# Patient Record
Sex: MALE | Race: OTHER | Employment: OTHER | URBAN - METROPOLITAN AREA
[De-identification: names, ages, dates, MRNs, and addresses within clinical notes are randomized per-mention and may not be internally consistent; named-entity substitution may affect disease eponyms.]

---

## 2017-03-17 ENCOUNTER — OFFICE VISIT (OUTPATIENT)
Dept: PRIMARY CARE CLINIC | Age: 57
End: 2017-03-17

## 2017-03-17 VITALS
HEART RATE: 78 BPM | BODY MASS INDEX: 27.47 KG/M2 | SYSTOLIC BLOOD PRESSURE: 136 MMHG | WEIGHT: 185.5 LBS | DIASTOLIC BLOOD PRESSURE: 82 MMHG | RESPIRATION RATE: 18 BRPM | HEIGHT: 69 IN | TEMPERATURE: 97 F

## 2017-03-17 DIAGNOSIS — R53.81 OTHER MALAISE AND FATIGUE: ICD-10-CM

## 2017-03-17 DIAGNOSIS — R53.83 OTHER MALAISE AND FATIGUE: ICD-10-CM

## 2017-03-17 DIAGNOSIS — Z12.5 SCREENING FOR PROSTATE CANCER: ICD-10-CM

## 2017-03-17 DIAGNOSIS — E78.5 DM TYPE 2 WITH DIABETIC DYSLIPIDEMIA (HCC): Primary | ICD-10-CM

## 2017-03-17 DIAGNOSIS — E11.69 DM TYPE 2 WITH DIABETIC DYSLIPIDEMIA (HCC): Primary | ICD-10-CM

## 2017-03-17 DIAGNOSIS — E78.5 HYPERLIPIDEMIA, UNSPECIFIED HYPERLIPIDEMIA TYPE: ICD-10-CM

## 2017-03-17 PROCEDURE — 99203 OFFICE O/P NEW LOW 30 MIN: CPT | Performed by: FAMILY MEDICINE

## 2017-03-17 RX ORDER — GLYBURIDE 2.5 MG/1
2.5 TABLET ORAL
COMMUNITY
End: 2017-03-21 | Stop reason: SDUPTHER

## 2017-03-17 RX ORDER — LOSARTAN POTASSIUM 25 MG/1
25 TABLET ORAL DAILY
COMMUNITY
End: 2017-03-21 | Stop reason: SDUPTHER

## 2017-03-17 RX ORDER — SIMVASTATIN 5 MG
TABLET ORAL
COMMUNITY
Start: 2017-01-09 | End: 2017-03-21 | Stop reason: SDUPTHER

## 2017-03-17 ASSESSMENT — PATIENT HEALTH QUESTIONNAIRE - PHQ9
2. FEELING DOWN, DEPRESSED OR HOPELESS: 0
SUM OF ALL RESPONSES TO PHQ QUESTIONS 1-9: 0
SUM OF ALL RESPONSES TO PHQ9 QUESTIONS 1 & 2: 0
1. LITTLE INTEREST OR PLEASURE IN DOING THINGS: 0

## 2017-03-18 DIAGNOSIS — E78.5 DM TYPE 2 WITH DIABETIC DYSLIPIDEMIA (HCC): ICD-10-CM

## 2017-03-18 DIAGNOSIS — E78.5 HYPERLIPIDEMIA, UNSPECIFIED HYPERLIPIDEMIA TYPE: ICD-10-CM

## 2017-03-18 DIAGNOSIS — Z12.5 SCREENING FOR PROSTATE CANCER: ICD-10-CM

## 2017-03-18 DIAGNOSIS — E11.69 DM TYPE 2 WITH DIABETIC DYSLIPIDEMIA (HCC): ICD-10-CM

## 2017-03-18 DIAGNOSIS — R53.83 OTHER MALAISE AND FATIGUE: ICD-10-CM

## 2017-03-18 DIAGNOSIS — R53.81 OTHER MALAISE AND FATIGUE: ICD-10-CM

## 2017-03-18 LAB
ALBUMIN SERPL-MCNC: 4.8 G/DL (ref 3.9–4.9)
ALP BLD-CCNC: 102 U/L (ref 35–104)
ALT SERPL-CCNC: 70 U/L (ref 0–41)
ANION GAP SERPL CALCULATED.3IONS-SCNC: 15 MEQ/L (ref 7–13)
AST SERPL-CCNC: 44 U/L (ref 0–40)
BASOPHILS ABSOLUTE: 0.1 K/UL (ref 0–0.2)
BASOPHILS RELATIVE PERCENT: 0.7 %
BILIRUB SERPL-MCNC: 0.4 MG/DL (ref 0–1.2)
BUN BLDV-MCNC: 11 MG/DL (ref 6–20)
CALCIUM SERPL-MCNC: 9.7 MG/DL (ref 8.6–10.2)
CHLORIDE BLD-SCNC: 97 MEQ/L (ref 98–107)
CHOLESTEROL, TOTAL: 166 MG/DL (ref 0–199)
CO2: 24 MEQ/L (ref 22–29)
CREAT SERPL-MCNC: 0.62 MG/DL (ref 0.7–1.2)
EOSINOPHILS ABSOLUTE: 0.3 K/UL (ref 0–0.7)
EOSINOPHILS RELATIVE PERCENT: 3.3 %
GFR AFRICAN AMERICAN: >60
GFR NON-AFRICAN AMERICAN: >60
GLOBULIN: 2.6 G/DL (ref 2.3–3.5)
GLUCOSE BLD-MCNC: 220 MG/DL (ref 74–109)
HBA1C MFR BLD: 8.9 % (ref 4.8–5.9)
HCT VFR BLD CALC: 44.2 % (ref 42–52)
HDLC SERPL-MCNC: 38 MG/DL (ref 40–59)
HEMOGLOBIN: 14.9 G/DL (ref 14–18)
LDL CHOLESTEROL CALCULATED: ABNORMAL MG/DL (ref 0–129)
LYMPHOCYTES ABSOLUTE: 2.7 K/UL (ref 1–4.8)
LYMPHOCYTES RELATIVE PERCENT: 32.9 %
MCH RBC QN AUTO: 30.2 PG (ref 27–31.3)
MCHC RBC AUTO-ENTMCNC: 33.7 % (ref 33–37)
MCV RBC AUTO: 89.6 FL (ref 80–100)
MONOCYTES ABSOLUTE: 0.5 K/UL (ref 0.2–0.8)
MONOCYTES RELATIVE PERCENT: 5.5 %
NEUTROPHILS ABSOLUTE: 4.7 K/UL (ref 1.4–6.5)
NEUTROPHILS RELATIVE PERCENT: 57.6 %
PDW BLD-RTO: 13 % (ref 11.5–14.5)
PLATELET # BLD: 280 K/UL (ref 130–400)
POTASSIUM SERPL-SCNC: 4.5 MEQ/L (ref 3.5–5.1)
PROSTATE SPECIFIC ANTIGEN: 2.27 NG/ML (ref 0–3.89)
RBC # BLD: 4.93 M/UL (ref 4.7–6.1)
SLIDE REVIEW: NORMAL
SODIUM BLD-SCNC: 136 MEQ/L (ref 132–144)
TOTAL PROTEIN: 7.4 G/DL (ref 6.4–8.1)
TRIGL SERPL-MCNC: 552 MG/DL (ref 0–200)
WBC # BLD: 8.2 K/UL (ref 4.8–10.8)

## 2017-03-21 ENCOUNTER — OFFICE VISIT (OUTPATIENT)
Dept: PRIMARY CARE CLINIC | Age: 57
End: 2017-03-21

## 2017-03-21 VITALS
RESPIRATION RATE: 18 BRPM | HEART RATE: 88 BPM | SYSTOLIC BLOOD PRESSURE: 136 MMHG | DIASTOLIC BLOOD PRESSURE: 88 MMHG | HEIGHT: 69 IN | TEMPERATURE: 97 F | WEIGHT: 183.8 LBS | BODY MASS INDEX: 27.22 KG/M2

## 2017-03-21 DIAGNOSIS — E78.5 DM TYPE 2 WITH DIABETIC DYSLIPIDEMIA (HCC): Primary | ICD-10-CM

## 2017-03-21 DIAGNOSIS — E78.5 HYPERLIPIDEMIA, UNSPECIFIED HYPERLIPIDEMIA TYPE: ICD-10-CM

## 2017-03-21 DIAGNOSIS — R53.83 OTHER MALAISE AND FATIGUE: ICD-10-CM

## 2017-03-21 DIAGNOSIS — R53.81 OTHER MALAISE AND FATIGUE: ICD-10-CM

## 2017-03-21 DIAGNOSIS — N52.8 OTHER MALE ERECTILE DYSFUNCTION: ICD-10-CM

## 2017-03-21 DIAGNOSIS — E11.69 DM TYPE 2 WITH DIABETIC DYSLIPIDEMIA (HCC): Primary | ICD-10-CM

## 2017-03-21 PROBLEM — N52.9 ERECTILE DYSFUNCTION: Status: ACTIVE | Noted: 2017-03-21

## 2017-03-21 PROCEDURE — 99213 OFFICE O/P EST LOW 20 MIN: CPT | Performed by: FAMILY MEDICINE

## 2017-03-21 RX ORDER — GLYBURIDE 2.5 MG/1
2.5 TABLET ORAL
Qty: 90 TABLET | Refills: 1 | Status: SHIPPED | OUTPATIENT
Start: 2017-03-21 | End: 2017-09-20 | Stop reason: SDUPTHER

## 2017-03-21 RX ORDER — LOSARTAN POTASSIUM 25 MG/1
25 TABLET ORAL DAILY
Qty: 90 TABLET | Refills: 1 | Status: SHIPPED | OUTPATIENT
Start: 2017-03-21 | End: 2017-09-20 | Stop reason: SDUPTHER

## 2017-03-21 RX ORDER — SIMVASTATIN 20 MG
TABLET ORAL
Qty: 90 TABLET | Refills: 1 | Status: SHIPPED | OUTPATIENT
Start: 2017-03-21 | End: 2017-09-20 | Stop reason: SDUPTHER

## 2017-03-29 ASSESSMENT — ENCOUNTER SYMPTOMS: BLURRED VISION: 0

## 2017-04-03 ENCOUNTER — TELEPHONE (OUTPATIENT)
Dept: PRIMARY CARE CLINIC | Age: 57
End: 2017-04-03

## 2017-04-19 ENCOUNTER — OFFICE VISIT (OUTPATIENT)
Dept: PRIMARY CARE CLINIC | Age: 57
End: 2017-04-19

## 2017-04-19 VITALS
BODY MASS INDEX: 27.4 KG/M2 | RESPIRATION RATE: 16 BRPM | SYSTOLIC BLOOD PRESSURE: 120 MMHG | HEIGHT: 69 IN | WEIGHT: 185 LBS | TEMPERATURE: 97.6 F | DIASTOLIC BLOOD PRESSURE: 86 MMHG | HEART RATE: 67 BPM | OXYGEN SATURATION: 90 %

## 2017-04-19 DIAGNOSIS — N52.8 OTHER MALE ERECTILE DYSFUNCTION: ICD-10-CM

## 2017-04-19 DIAGNOSIS — E11.69 DM TYPE 2 WITH DIABETIC DYSLIPIDEMIA (HCC): Primary | ICD-10-CM

## 2017-04-19 DIAGNOSIS — E78.5 DM TYPE 2 WITH DIABETIC DYSLIPIDEMIA (HCC): Primary | ICD-10-CM

## 2017-04-19 DIAGNOSIS — E78.5 HYPERLIPIDEMIA, UNSPECIFIED HYPERLIPIDEMIA TYPE: ICD-10-CM

## 2017-04-19 PROCEDURE — 99214 OFFICE O/P EST MOD 30 MIN: CPT | Performed by: FAMILY MEDICINE

## 2017-04-19 ASSESSMENT — ENCOUNTER SYMPTOMS
CHEST TIGHTNESS: 0
SHORTNESS OF BREATH: 0

## 2017-04-30 ASSESSMENT — ENCOUNTER SYMPTOMS
APNEA: 0
EYE DISCHARGE: 0
ABDOMINAL DISTENTION: 0

## 2017-06-06 ENCOUNTER — OFFICE VISIT (OUTPATIENT)
Dept: PRIMARY CARE CLINIC | Age: 57
End: 2017-06-06

## 2017-06-06 VITALS
OXYGEN SATURATION: 97 % | RESPIRATION RATE: 16 BRPM | DIASTOLIC BLOOD PRESSURE: 80 MMHG | HEART RATE: 82 BPM | BODY MASS INDEX: 27.7 KG/M2 | TEMPERATURE: 96.2 F | WEIGHT: 187 LBS | SYSTOLIC BLOOD PRESSURE: 132 MMHG | HEIGHT: 69 IN

## 2017-06-06 DIAGNOSIS — R53.83 FATIGUE, UNSPECIFIED TYPE: ICD-10-CM

## 2017-06-06 DIAGNOSIS — E78.5 DM TYPE 2 WITH DIABETIC DYSLIPIDEMIA (HCC): Primary | ICD-10-CM

## 2017-06-06 DIAGNOSIS — E78.5 HYPERLIPIDEMIA, UNSPECIFIED HYPERLIPIDEMIA TYPE: ICD-10-CM

## 2017-06-06 DIAGNOSIS — E11.69 DM TYPE 2 WITH DIABETIC DYSLIPIDEMIA (HCC): Primary | ICD-10-CM

## 2017-06-06 PROCEDURE — 99214 OFFICE O/P EST MOD 30 MIN: CPT | Performed by: FAMILY MEDICINE

## 2017-06-06 ASSESSMENT — ENCOUNTER SYMPTOMS
SHORTNESS OF BREATH: 0
CHEST TIGHTNESS: 0

## 2017-06-07 DIAGNOSIS — R53.83 FATIGUE, UNSPECIFIED TYPE: ICD-10-CM

## 2017-06-07 DIAGNOSIS — E78.5 HYPERLIPIDEMIA, UNSPECIFIED HYPERLIPIDEMIA TYPE: ICD-10-CM

## 2017-06-07 DIAGNOSIS — E78.5 DM TYPE 2 WITH DIABETIC DYSLIPIDEMIA (HCC): ICD-10-CM

## 2017-06-07 DIAGNOSIS — E11.69 DM TYPE 2 WITH DIABETIC DYSLIPIDEMIA (HCC): ICD-10-CM

## 2017-06-07 LAB
ALBUMIN SERPL-MCNC: 4.6 G/DL (ref 3.9–4.9)
ALP BLD-CCNC: 73 U/L (ref 35–104)
ALT SERPL-CCNC: 40 U/L (ref 0–41)
ANION GAP SERPL CALCULATED.3IONS-SCNC: 18 MEQ/L (ref 7–13)
AST SERPL-CCNC: 42 U/L (ref 0–40)
BASOPHILS ABSOLUTE: 0.1 K/UL (ref 0–0.2)
BASOPHILS RELATIVE PERCENT: 0.9 %
BILIRUB SERPL-MCNC: 1.6 MG/DL (ref 0–1.2)
BUN BLDV-MCNC: 8 MG/DL (ref 6–20)
CALCIUM SERPL-MCNC: 9.2 MG/DL (ref 8.6–10.2)
CHLORIDE BLD-SCNC: 97 MEQ/L (ref 98–107)
CHOLESTEROL, TOTAL: 123 MG/DL (ref 0–199)
CO2: 22 MEQ/L (ref 22–29)
CREAT SERPL-MCNC: 0.59 MG/DL (ref 0.7–1.2)
EOSINOPHILS ABSOLUTE: 0.2 K/UL (ref 0–0.7)
EOSINOPHILS RELATIVE PERCENT: 3.4 %
GFR AFRICAN AMERICAN: >60
GFR NON-AFRICAN AMERICAN: >60
GLOBULIN: 2.5 G/DL (ref 2.3–3.5)
GLUCOSE BLD-MCNC: 128 MG/DL (ref 74–109)
HBA1C MFR BLD: 7.6 % (ref 4.8–5.9)
HCT VFR BLD CALC: 42.2 % (ref 42–52)
HDLC SERPL-MCNC: 52 MG/DL (ref 40–59)
HEMOGLOBIN: 14.3 G/DL (ref 14–18)
LDL CHOLESTEROL CALCULATED: 37 MG/DL (ref 0–129)
LYMPHOCYTES ABSOLUTE: 3.1 K/UL (ref 1–4.8)
LYMPHOCYTES RELATIVE PERCENT: 44.6 %
MCH RBC QN AUTO: 30.4 PG (ref 27–31.3)
MCHC RBC AUTO-ENTMCNC: 33.9 % (ref 33–37)
MCV RBC AUTO: 89.7 FL (ref 80–100)
MONOCYTES ABSOLUTE: 0.6 K/UL (ref 0.2–0.8)
MONOCYTES RELATIVE PERCENT: 8.1 %
NEUTROPHILS ABSOLUTE: 3 K/UL (ref 1.4–6.5)
NEUTROPHILS RELATIVE PERCENT: 43 %
PDW BLD-RTO: 13 % (ref 11.5–14.5)
PLATELET # BLD: 241 K/UL (ref 130–400)
POTASSIUM SERPL-SCNC: 4.4 MEQ/L (ref 3.5–5.1)
RBC # BLD: 4.71 M/UL (ref 4.7–6.1)
SLIDE REVIEW: NORMAL
SODIUM BLD-SCNC: 137 MEQ/L (ref 132–144)
TOTAL PROTEIN: 7.1 G/DL (ref 6.4–8.1)
TRIGL SERPL-MCNC: 170 MG/DL (ref 0–200)
WBC # BLD: 6.9 K/UL (ref 4.8–10.8)

## 2017-06-12 ENCOUNTER — TELEPHONE (OUTPATIENT)
Dept: PRIMARY CARE CLINIC | Age: 57
End: 2017-06-12

## 2017-07-19 ENCOUNTER — OFFICE VISIT (OUTPATIENT)
Dept: PRIMARY CARE CLINIC | Age: 57
End: 2017-07-19

## 2017-07-19 VITALS
HEIGHT: 69 IN | WEIGHT: 186 LBS | TEMPERATURE: 97.4 F | OXYGEN SATURATION: 96 % | RESPIRATION RATE: 16 BRPM | HEART RATE: 92 BPM | SYSTOLIC BLOOD PRESSURE: 124 MMHG | DIASTOLIC BLOOD PRESSURE: 84 MMHG | BODY MASS INDEX: 27.55 KG/M2

## 2017-07-19 DIAGNOSIS — E78.5 HYPERLIPIDEMIA, UNSPECIFIED HYPERLIPIDEMIA TYPE: ICD-10-CM

## 2017-07-19 DIAGNOSIS — E11.69 DM TYPE 2 WITH DIABETIC DYSLIPIDEMIA (HCC): Primary | ICD-10-CM

## 2017-07-19 DIAGNOSIS — N52.8 OTHER MALE ERECTILE DYSFUNCTION: ICD-10-CM

## 2017-07-19 DIAGNOSIS — E78.5 DM TYPE 2 WITH DIABETIC DYSLIPIDEMIA (HCC): Primary | ICD-10-CM

## 2017-07-19 PROCEDURE — 99214 OFFICE O/P EST MOD 30 MIN: CPT | Performed by: FAMILY MEDICINE

## 2017-07-19 RX ORDER — LOSARTAN POTASSIUM 25 MG/1
25 TABLET ORAL DAILY
Qty: 90 TABLET | Refills: 1 | Status: CANCELLED | OUTPATIENT
Start: 2017-07-19

## 2017-07-19 RX ORDER — GLYBURIDE 2.5 MG/1
2.5 TABLET ORAL
Qty: 90 TABLET | Refills: 1 | Status: CANCELLED | OUTPATIENT
Start: 2017-07-19

## 2017-07-19 RX ORDER — SIMVASTATIN 20 MG
TABLET ORAL
Qty: 90 TABLET | Refills: 1 | Status: CANCELLED | OUTPATIENT
Start: 2017-07-19

## 2017-07-19 ASSESSMENT — ENCOUNTER SYMPTOMS
BLURRED VISION: 0
SHORTNESS OF BREATH: 0

## 2017-09-20 ENCOUNTER — OFFICE VISIT (OUTPATIENT)
Dept: PRIMARY CARE CLINIC | Age: 57
End: 2017-09-20

## 2017-09-20 VITALS
RESPIRATION RATE: 16 BRPM | WEIGHT: 183 LBS | DIASTOLIC BLOOD PRESSURE: 78 MMHG | HEIGHT: 69 IN | BODY MASS INDEX: 27.11 KG/M2 | SYSTOLIC BLOOD PRESSURE: 96 MMHG | TEMPERATURE: 98 F | HEART RATE: 76 BPM

## 2017-09-20 DIAGNOSIS — E11.69 DM TYPE 2 WITH DIABETIC DYSLIPIDEMIA (HCC): Primary | ICD-10-CM

## 2017-09-20 DIAGNOSIS — E78.5 HYPERLIPIDEMIA, UNSPECIFIED HYPERLIPIDEMIA TYPE: ICD-10-CM

## 2017-09-20 DIAGNOSIS — E78.5 DM TYPE 2 WITH DIABETIC DYSLIPIDEMIA (HCC): Primary | ICD-10-CM

## 2017-09-20 DIAGNOSIS — R53.83 FATIGUE, UNSPECIFIED TYPE: ICD-10-CM

## 2017-09-20 PROCEDURE — 99214 OFFICE O/P EST MOD 30 MIN: CPT | Performed by: FAMILY MEDICINE

## 2017-09-20 RX ORDER — GLYBURIDE 2.5 MG/1
2.5 TABLET ORAL
Qty: 90 TABLET | Refills: 1 | Status: SHIPPED | OUTPATIENT
Start: 2017-09-20 | End: 2018-01-30 | Stop reason: SDUPTHER

## 2017-09-20 RX ORDER — SIMVASTATIN 20 MG
TABLET ORAL
Qty: 90 TABLET | Refills: 1 | Status: SHIPPED | OUTPATIENT
Start: 2017-09-20 | End: 2017-11-14 | Stop reason: SINTOL

## 2017-09-20 RX ORDER — LOSARTAN POTASSIUM 25 MG/1
25 TABLET ORAL DAILY
Qty: 90 TABLET | Refills: 1 | Status: SHIPPED | OUTPATIENT
Start: 2017-09-20 | End: 2018-01-30 | Stop reason: SDUPTHER

## 2017-09-20 ASSESSMENT — ENCOUNTER SYMPTOMS
BLURRED VISION: 0
SHORTNESS OF BREATH: 0

## 2017-11-08 DIAGNOSIS — E11.69 DM TYPE 2 WITH DIABETIC DYSLIPIDEMIA (HCC): ICD-10-CM

## 2017-11-08 DIAGNOSIS — E78.5 HYPERLIPIDEMIA, UNSPECIFIED HYPERLIPIDEMIA TYPE: ICD-10-CM

## 2017-11-08 DIAGNOSIS — E78.5 DM TYPE 2 WITH DIABETIC DYSLIPIDEMIA (HCC): ICD-10-CM

## 2017-11-08 DIAGNOSIS — R53.83 FATIGUE, UNSPECIFIED TYPE: ICD-10-CM

## 2017-11-08 LAB
ALBUMIN SERPL-MCNC: 4.7 G/DL (ref 3.9–4.9)
ALP BLD-CCNC: 71 U/L (ref 35–104)
ALT SERPL-CCNC: 24 U/L (ref 0–41)
ANION GAP SERPL CALCULATED.3IONS-SCNC: 14 MEQ/L (ref 7–13)
AST SERPL-CCNC: 21 U/L (ref 0–40)
BASOPHILS ABSOLUTE: 0 K/UL (ref 0–0.2)
BASOPHILS RELATIVE PERCENT: 0.5 %
BILIRUB SERPL-MCNC: 0.8 MG/DL (ref 0–1.2)
BUN BLDV-MCNC: 9 MG/DL (ref 6–20)
CALCIUM SERPL-MCNC: 9.4 MG/DL (ref 8.6–10.2)
CHLORIDE BLD-SCNC: 99 MEQ/L (ref 98–107)
CHOLESTEROL, TOTAL: 124 MG/DL (ref 0–199)
CO2: 25 MEQ/L (ref 22–29)
CREAT SERPL-MCNC: 0.55 MG/DL (ref 0.7–1.2)
CREATININE URINE: 152.9 MG/DL
EOSINOPHILS ABSOLUTE: 0.4 K/UL (ref 0–0.7)
EOSINOPHILS RELATIVE PERCENT: 4.5 %
GFR AFRICAN AMERICAN: >60
GFR NON-AFRICAN AMERICAN: >60
GLOBULIN: 2.6 G/DL (ref 2.3–3.5)
GLUCOSE BLD-MCNC: 140 MG/DL (ref 74–109)
HBA1C MFR BLD: 7.4 % (ref 4.8–5.9)
HCT VFR BLD CALC: 42.5 % (ref 42–52)
HDLC SERPL-MCNC: 53 MG/DL (ref 40–59)
HEMOGLOBIN: 14.1 G/DL (ref 14–18)
LDL CHOLESTEROL CALCULATED: 49 MG/DL (ref 0–129)
LYMPHOCYTES ABSOLUTE: 3 K/UL (ref 1–4.8)
LYMPHOCYTES RELATIVE PERCENT: 34.6 %
MCH RBC QN AUTO: 29.7 PG (ref 27–31.3)
MCHC RBC AUTO-ENTMCNC: 33.1 % (ref 33–37)
MCV RBC AUTO: 89.7 FL (ref 80–100)
MICROALBUMIN UR-MCNC: 2.8 MG/DL
MICROALBUMIN/CREAT UR-RTO: 18.3 MG/G (ref 0–30)
MONOCYTES ABSOLUTE: 0.7 K/UL (ref 0.2–0.8)
MONOCYTES RELATIVE PERCENT: 7.7 %
NEUTROPHILS ABSOLUTE: 4.6 K/UL (ref 1.4–6.5)
NEUTROPHILS RELATIVE PERCENT: 52.7 %
PDW BLD-RTO: 13.1 % (ref 11.5–14.5)
PLATELET # BLD: 254 K/UL (ref 130–400)
POTASSIUM SERPL-SCNC: 4.6 MEQ/L (ref 3.5–5.1)
RBC # BLD: 4.74 M/UL (ref 4.7–6.1)
SODIUM BLD-SCNC: 138 MEQ/L (ref 132–144)
TOTAL PROTEIN: 7.3 G/DL (ref 6.4–8.1)
TRIGL SERPL-MCNC: 112 MG/DL (ref 0–200)
WBC # BLD: 8.8 K/UL (ref 4.8–10.8)

## 2017-11-14 ENCOUNTER — OFFICE VISIT (OUTPATIENT)
Dept: PRIMARY CARE CLINIC | Age: 57
End: 2017-11-14

## 2017-11-14 VITALS
RESPIRATION RATE: 16 BRPM | HEART RATE: 76 BPM | BODY MASS INDEX: 27.25 KG/M2 | SYSTOLIC BLOOD PRESSURE: 128 MMHG | HEIGHT: 69 IN | WEIGHT: 184 LBS | DIASTOLIC BLOOD PRESSURE: 84 MMHG | OXYGEN SATURATION: 97 % | TEMPERATURE: 98.4 F

## 2017-11-14 DIAGNOSIS — M19.90 OSTEOARTHRITIS, UNSPECIFIED OSTEOARTHRITIS TYPE, UNSPECIFIED SITE: ICD-10-CM

## 2017-11-14 DIAGNOSIS — Z12.11 SCREENING FOR COLON CANCER: ICD-10-CM

## 2017-11-14 DIAGNOSIS — N52.8 OTHER MALE ERECTILE DYSFUNCTION: ICD-10-CM

## 2017-11-14 DIAGNOSIS — E11.69 DM TYPE 2 WITH DIABETIC DYSLIPIDEMIA (HCC): Primary | ICD-10-CM

## 2017-11-14 DIAGNOSIS — E78.5 DM TYPE 2 WITH DIABETIC DYSLIPIDEMIA (HCC): Primary | ICD-10-CM

## 2017-11-14 DIAGNOSIS — E78.5 HYPERLIPIDEMIA, UNSPECIFIED HYPERLIPIDEMIA TYPE: ICD-10-CM

## 2017-11-14 PROCEDURE — G8427 DOCREV CUR MEDS BY ELIG CLIN: HCPCS | Performed by: FAMILY MEDICINE

## 2017-11-14 PROCEDURE — 1036F TOBACCO NON-USER: CPT | Performed by: FAMILY MEDICINE

## 2017-11-14 PROCEDURE — G8417 CALC BMI ABV UP PARAM F/U: HCPCS | Performed by: FAMILY MEDICINE

## 2017-11-14 PROCEDURE — 3045F PR MOST RECENT HEMOGLOBIN A1C LEVEL 7.0-9.0%: CPT | Performed by: FAMILY MEDICINE

## 2017-11-14 PROCEDURE — 99214 OFFICE O/P EST MOD 30 MIN: CPT | Performed by: FAMILY MEDICINE

## 2017-11-14 PROCEDURE — G8484 FLU IMMUNIZE NO ADMIN: HCPCS | Performed by: FAMILY MEDICINE

## 2017-11-14 PROCEDURE — 3017F COLORECTAL CA SCREEN DOC REV: CPT | Performed by: FAMILY MEDICINE

## 2017-11-14 RX ORDER — DICLOFENAC SODIUM 75 MG/1
75 TABLET, DELAYED RELEASE ORAL 2 TIMES DAILY
Qty: 60 TABLET | Refills: 1 | Status: SHIPPED | OUTPATIENT
Start: 2017-11-14 | End: 2018-04-24

## 2017-11-14 RX ORDER — ATORVASTATIN CALCIUM 10 MG/1
10 TABLET, FILM COATED ORAL DAILY
Qty: 30 TABLET | Refills: 5 | Status: SHIPPED | OUTPATIENT
Start: 2017-11-14 | End: 2018-01-30 | Stop reason: SDUPTHER

## 2017-11-14 ASSESSMENT — ENCOUNTER SYMPTOMS
ABDOMINAL PAIN: 0
VISUAL CHANGE: 0
SHORTNESS OF BREATH: 0
WHEEZING: 0
BLURRED VISION: 0

## 2017-11-14 NOTE — PROGRESS NOTES
Subjective:      Patient ID: Kathy Gomez is a 65 y.o. male who presents today for:  Chief Complaint   Patient presents with    Diabetes     patient following up for chronic DM. patient states his glucose was 160 this morning. patient reports NO chest pain, sob, dizziness, lightheadedness, sweats, or tremors. patient would like to discuss lab results.  Hyperlipidemia     patient would like to discuss changing medications. patient is currently taking simvastatin and states he has had alot of joint pain since starting this medication. patient states his alot of his family members has had similar issues with this. Diabetes   He presents for his follow-up diabetic visit. He has type 2 diabetes mellitus. His disease course has been stable. There are no hypoglycemic associated symptoms. Pertinent negatives for hypoglycemia include no dizziness or headaches. Pertinent negatives for diabetes include no blurred vision, no chest pain, no fatigue, no foot ulcerations, no visual change, no weakness and no weight loss. There are no hypoglycemic complications. Symptoms are stable. Risk factors for coronary artery disease include dyslipidemia, diabetes mellitus, hypertension and male sex. Current diabetic treatment includes oral agent (triple therapy). He is compliant with treatment all of the time. His weight is stable. He is following a diabetic diet. When asked about meal planning, he reported none. He has not had a previous visit with a dietitian. He participates in exercise intermittently. His overall blood glucose range is 140-180 mg/dl. He does not see a podiatrist.Eye exam is current. Hyperlipidemia   This is a chronic problem. The current episode started more than 1 year ago. The problem is controlled. There are no known factors aggravating his hyperlipidemia. Associated symptoms include myalgias. Pertinent negatives include no chest pain, leg pain or shortness of breath.  Current antihyperlipidemic treatment includes statins. The current treatment provides significant improvement of lipids. Risk factors for coronary artery disease include hypertension, dyslipidemia, diabetes mellitus and male sex. Past Medical History:   Diagnosis Date    DM (diabetes mellitus screen)      Past Surgical History:   Procedure Laterality Date    APPENDECTOMY       Family History   Problem Relation Age of Onset    Stroke Father     Arthritis Sister     Diabetes Sister     Diabetes Brother      Social History     Social History    Marital status:      Spouse name: N/A    Number of children: N/A    Years of education: N/A     Occupational History    Not on file. Social History Main Topics    Smoking status: Never Smoker    Smokeless tobacco: Never Used    Alcohol use 3.6 oz/week     6 Cans of beer per week    Drug use: No    Sexual activity: Not on file     Other Topics Concern    Not on file     Social History Narrative    No narrative on file     Allergies:  Review of patient's allergies indicates no known allergies. Review of Systems   Constitutional: Negative for activity change, appetite change, chills, diaphoresis, fatigue, fever, unexpected weight change and weight loss. Eyes: Negative for blurred vision. Respiratory: Negative for shortness of breath and wheezing. Cardiovascular: Negative for chest pain, palpitations and leg swelling. Gastrointestinal: Negative for abdominal pain. Musculoskeletal: Positive for myalgias. Neurological: Negative for dizziness, weakness, light-headedness and headaches. Objective:   /84   Pulse 76   Temp 98.4 °F (36.9 °C) (Tympanic)   Resp 16   Ht 5' 9\" (1.753 m)   Wt 184 lb (83.5 kg)   SpO2 97%   BMI 27.17 kg/m²     Physical Exam      PHYSICAL EXAMINATION:   VITAL SIGNS: are as recorded. GENERAL:  The patient appears well nourished and well-developed, and with normal affect. No acute respiratory distress.  Alert and oriented signed by: Valeria Beasley MD    11/14/17 2:31 PM    MD Ayo Gibbons

## 2018-01-30 ENCOUNTER — OFFICE VISIT (OUTPATIENT)
Dept: PRIMARY CARE CLINIC | Age: 58
End: 2018-01-30
Payer: COMMERCIAL

## 2018-01-30 VITALS
HEIGHT: 69 IN | BODY MASS INDEX: 27.11 KG/M2 | SYSTOLIC BLOOD PRESSURE: 102 MMHG | RESPIRATION RATE: 14 BRPM | TEMPERATURE: 97.8 F | DIASTOLIC BLOOD PRESSURE: 74 MMHG | WEIGHT: 183 LBS | HEART RATE: 92 BPM

## 2018-01-30 DIAGNOSIS — E78.5 DM TYPE 2 WITH DIABETIC DYSLIPIDEMIA (HCC): ICD-10-CM

## 2018-01-30 DIAGNOSIS — E55.9 VITAMIN D DEFICIENCY: ICD-10-CM

## 2018-01-30 DIAGNOSIS — Z12.5 PROSTATE CANCER SCREENING: ICD-10-CM

## 2018-01-30 DIAGNOSIS — E11.69 DM TYPE 2 WITH DIABETIC DYSLIPIDEMIA (HCC): ICD-10-CM

## 2018-01-30 DIAGNOSIS — R53.83 FATIGUE, UNSPECIFIED TYPE: ICD-10-CM

## 2018-01-30 DIAGNOSIS — E78.5 HYPERLIPIDEMIA, UNSPECIFIED HYPERLIPIDEMIA TYPE: Primary | ICD-10-CM

## 2018-01-30 PROCEDURE — G8427 DOCREV CUR MEDS BY ELIG CLIN: HCPCS | Performed by: FAMILY MEDICINE

## 2018-01-30 PROCEDURE — 1036F TOBACCO NON-USER: CPT | Performed by: FAMILY MEDICINE

## 2018-01-30 PROCEDURE — G8417 CALC BMI ABV UP PARAM F/U: HCPCS | Performed by: FAMILY MEDICINE

## 2018-01-30 PROCEDURE — G8484 FLU IMMUNIZE NO ADMIN: HCPCS | Performed by: FAMILY MEDICINE

## 2018-01-30 PROCEDURE — 3046F HEMOGLOBIN A1C LEVEL >9.0%: CPT | Performed by: FAMILY MEDICINE

## 2018-01-30 PROCEDURE — 99214 OFFICE O/P EST MOD 30 MIN: CPT | Performed by: FAMILY MEDICINE

## 2018-01-30 PROCEDURE — 3017F COLORECTAL CA SCREEN DOC REV: CPT | Performed by: FAMILY MEDICINE

## 2018-01-30 RX ORDER — ATORVASTATIN CALCIUM 10 MG/1
10 TABLET, FILM COATED ORAL DAILY
Qty: 90 TABLET | Refills: 1 | Status: SHIPPED | OUTPATIENT
Start: 2018-01-30 | End: 2018-07-09 | Stop reason: SDUPTHER

## 2018-01-30 RX ORDER — LOSARTAN POTASSIUM 25 MG/1
25 TABLET ORAL DAILY
Qty: 90 TABLET | Refills: 1 | Status: SHIPPED | OUTPATIENT
Start: 2018-01-30 | End: 2018-07-09 | Stop reason: SDUPTHER

## 2018-01-30 RX ORDER — GLYBURIDE 2.5 MG/1
2.5 TABLET ORAL
Qty: 90 TABLET | Refills: 1 | Status: SHIPPED | OUTPATIENT
Start: 2018-01-30 | End: 2018-04-24 | Stop reason: SDUPTHER

## 2018-01-30 ASSESSMENT — ENCOUNTER SYMPTOMS
CHEST TIGHTNESS: 0
SHORTNESS OF BREATH: 0

## 2018-04-21 DIAGNOSIS — Z12.5 PROSTATE CANCER SCREENING: ICD-10-CM

## 2018-04-21 DIAGNOSIS — E78.5 HYPERLIPIDEMIA, UNSPECIFIED HYPERLIPIDEMIA TYPE: ICD-10-CM

## 2018-04-21 DIAGNOSIS — E11.69 DM TYPE 2 WITH DIABETIC DYSLIPIDEMIA (HCC): ICD-10-CM

## 2018-04-21 DIAGNOSIS — E78.5 DM TYPE 2 WITH DIABETIC DYSLIPIDEMIA (HCC): ICD-10-CM

## 2018-04-21 DIAGNOSIS — R53.83 FATIGUE, UNSPECIFIED TYPE: ICD-10-CM

## 2018-04-21 DIAGNOSIS — E55.9 VITAMIN D DEFICIENCY: ICD-10-CM

## 2018-04-21 LAB
ALBUMIN SERPL-MCNC: 4.8 G/DL (ref 3.9–4.9)
ALP BLD-CCNC: 81 U/L (ref 35–104)
ALT SERPL-CCNC: 21 U/L (ref 0–41)
ANION GAP SERPL CALCULATED.3IONS-SCNC: 17 MEQ/L (ref 7–13)
AST SERPL-CCNC: 19 U/L (ref 0–40)
BILIRUB SERPL-MCNC: 1 MG/DL (ref 0–1.2)
BUN BLDV-MCNC: 10 MG/DL (ref 6–20)
CALCIUM SERPL-MCNC: 9 MG/DL (ref 8.6–10.2)
CHLORIDE BLD-SCNC: 100 MEQ/L (ref 98–107)
CHOLESTEROL, TOTAL: 121 MG/DL (ref 0–199)
CO2: 26 MEQ/L (ref 22–29)
CREAT SERPL-MCNC: 0.61 MG/DL (ref 0.7–1.2)
GFR AFRICAN AMERICAN: >60
GFR NON-AFRICAN AMERICAN: >60
GLOBULIN: 2.3 G/DL (ref 2.3–3.5)
GLUCOSE BLD-MCNC: 159 MG/DL (ref 74–109)
HBA1C MFR BLD: 8.4 % (ref 4.8–5.9)
HCT VFR BLD CALC: 43 % (ref 42–52)
HDLC SERPL-MCNC: 48 MG/DL (ref 40–59)
HEMOGLOBIN: 14.7 G/DL (ref 14–18)
LDL CHOLESTEROL CALCULATED: 42 MG/DL (ref 0–129)
MCH RBC QN AUTO: 30.9 PG (ref 27–31.3)
MCHC RBC AUTO-ENTMCNC: 34.2 % (ref 33–37)
MCV RBC AUTO: 90.3 FL (ref 80–100)
PDW BLD-RTO: 13 % (ref 11.5–14.5)
PLATELET # BLD: 259 K/UL (ref 130–400)
POTASSIUM SERPL-SCNC: 4.5 MEQ/L (ref 3.5–5.1)
PROSTATE SPECIFIC ANTIGEN: 2.08 NG/ML (ref 0–3.89)
RBC # BLD: 4.77 M/UL (ref 4.7–6.1)
SODIUM BLD-SCNC: 143 MEQ/L (ref 132–144)
TOTAL PROTEIN: 7.1 G/DL (ref 6.4–8.1)
TRIGL SERPL-MCNC: 157 MG/DL (ref 0–200)
VITAMIN D 25-HYDROXY: 12.7 NG/ML (ref 30–100)
WBC # BLD: 7.3 K/UL (ref 4.8–10.8)

## 2018-04-24 ENCOUNTER — OFFICE VISIT (OUTPATIENT)
Dept: PRIMARY CARE CLINIC | Age: 58
End: 2018-04-24
Payer: COMMERCIAL

## 2018-04-24 VITALS
BODY MASS INDEX: 27.4 KG/M2 | HEIGHT: 69 IN | WEIGHT: 185 LBS | RESPIRATION RATE: 16 BRPM | HEART RATE: 81 BPM | TEMPERATURE: 98.2 F | DIASTOLIC BLOOD PRESSURE: 84 MMHG | SYSTOLIC BLOOD PRESSURE: 122 MMHG | OXYGEN SATURATION: 98 %

## 2018-04-24 DIAGNOSIS — E11.69 DM TYPE 2 WITH DIABETIC DYSLIPIDEMIA (HCC): ICD-10-CM

## 2018-04-24 DIAGNOSIS — E78.5 DM TYPE 2 WITH DIABETIC DYSLIPIDEMIA (HCC): ICD-10-CM

## 2018-04-24 DIAGNOSIS — E55.9 VITAMIN D DEFICIENCY: ICD-10-CM

## 2018-04-24 DIAGNOSIS — E78.5 HYPERLIPIDEMIA, UNSPECIFIED HYPERLIPIDEMIA TYPE: Primary | ICD-10-CM

## 2018-04-24 PROCEDURE — G8427 DOCREV CUR MEDS BY ELIG CLIN: HCPCS | Performed by: FAMILY MEDICINE

## 2018-04-24 PROCEDURE — 1036F TOBACCO NON-USER: CPT | Performed by: FAMILY MEDICINE

## 2018-04-24 PROCEDURE — G8417 CALC BMI ABV UP PARAM F/U: HCPCS | Performed by: FAMILY MEDICINE

## 2018-04-24 PROCEDURE — 3045F PR MOST RECENT HEMOGLOBIN A1C LEVEL 7.0-9.0%: CPT | Performed by: FAMILY MEDICINE

## 2018-04-24 PROCEDURE — 2022F DILAT RTA XM EVC RTNOPTHY: CPT | Performed by: FAMILY MEDICINE

## 2018-04-24 PROCEDURE — 99214 OFFICE O/P EST MOD 30 MIN: CPT | Performed by: FAMILY MEDICINE

## 2018-04-24 PROCEDURE — 3017F COLORECTAL CA SCREEN DOC REV: CPT | Performed by: FAMILY MEDICINE

## 2018-04-24 RX ORDER — CHOLECALCIFEROL (VITAMIN D3) 50 MCG
2000 TABLET ORAL DAILY
Qty: 90 TABLET | Refills: 1 | Status: SHIPPED | OUTPATIENT
Start: 2018-04-24 | End: 2018-10-23 | Stop reason: SDUPTHER

## 2018-04-24 RX ORDER — PIOGLITAZONEHYDROCHLORIDE 45 MG/1
45 TABLET ORAL DAILY
Qty: 90 TABLET | Refills: 1 | Status: SHIPPED | OUTPATIENT
Start: 2018-04-24 | End: 2018-10-23 | Stop reason: SDUPTHER

## 2018-04-24 RX ORDER — GLYBURIDE 2.5 MG/1
2.5 TABLET ORAL
Qty: 90 TABLET | Refills: 1 | Status: SHIPPED | OUTPATIENT
Start: 2018-04-24 | End: 2018-10-23 | Stop reason: SDUPTHER

## 2018-04-24 RX ORDER — METFORMIN HYDROCHLORIDE 500 MG/1
500 TABLET, EXTENDED RELEASE ORAL
Qty: 360 TABLET | Refills: 1 | Status: SHIPPED | OUTPATIENT
Start: 2018-04-24 | End: 2018-10-23 | Stop reason: SDUPTHER

## 2018-04-24 ASSESSMENT — PATIENT HEALTH QUESTIONNAIRE - PHQ9
SUM OF ALL RESPONSES TO PHQ9 QUESTIONS 1 & 2: 0
2. FEELING DOWN, DEPRESSED OR HOPELESS: 0
1. LITTLE INTEREST OR PLEASURE IN DOING THINGS: 0
SUM OF ALL RESPONSES TO PHQ QUESTIONS 1-9: 0

## 2018-04-24 ASSESSMENT — ENCOUNTER SYMPTOMS
WHEEZING: 0
EYE DISCHARGE: 0
CONSTIPATION: 0
SHORTNESS OF BREATH: 0
NAUSEA: 0
APNEA: 0
EYE REDNESS: 0
CHEST TIGHTNESS: 0
DIARRHEA: 0
FACIAL SWELLING: 0
EYE PAIN: 0
CHOKING: 0
COLOR CHANGE: 0
ABDOMINAL PAIN: 0
STRIDOR: 0
PHOTOPHOBIA: 0

## 2018-07-09 DIAGNOSIS — E78.5 DM TYPE 2 WITH DIABETIC DYSLIPIDEMIA (HCC): ICD-10-CM

## 2018-07-09 DIAGNOSIS — E11.69 DM TYPE 2 WITH DIABETIC DYSLIPIDEMIA (HCC): ICD-10-CM

## 2018-07-09 RX ORDER — ATORVASTATIN CALCIUM 10 MG/1
10 TABLET, FILM COATED ORAL DAILY
Qty: 90 TABLET | Refills: 0 | Status: SHIPPED | OUTPATIENT
Start: 2018-07-09 | End: 2018-10-23 | Stop reason: SDUPTHER

## 2018-07-09 RX ORDER — LOSARTAN POTASSIUM 25 MG/1
25 TABLET ORAL DAILY
Qty: 90 TABLET | Refills: 0 | Status: SHIPPED | OUTPATIENT
Start: 2018-07-09 | End: 2018-10-23 | Stop reason: SDUPTHER

## 2018-07-18 DIAGNOSIS — E11.69 DM TYPE 2 WITH DIABETIC DYSLIPIDEMIA (HCC): ICD-10-CM

## 2018-07-18 DIAGNOSIS — E78.5 DM TYPE 2 WITH DIABETIC DYSLIPIDEMIA (HCC): ICD-10-CM

## 2018-08-14 ENCOUNTER — OFFICE VISIT (OUTPATIENT)
Dept: PRIMARY CARE CLINIC | Age: 58
End: 2018-08-14
Payer: COMMERCIAL

## 2018-08-14 VITALS
HEART RATE: 88 BPM | HEIGHT: 69 IN | WEIGHT: 185.7 LBS | BODY MASS INDEX: 27.5 KG/M2 | RESPIRATION RATE: 14 BRPM | TEMPERATURE: 98.1 F | OXYGEN SATURATION: 97 % | DIASTOLIC BLOOD PRESSURE: 80 MMHG | SYSTOLIC BLOOD PRESSURE: 126 MMHG

## 2018-08-14 DIAGNOSIS — E78.5 HYPERLIPIDEMIA, UNSPECIFIED HYPERLIPIDEMIA TYPE: ICD-10-CM

## 2018-08-14 DIAGNOSIS — E78.5 DM TYPE 2 WITH DIABETIC DYSLIPIDEMIA (HCC): Primary | ICD-10-CM

## 2018-08-14 DIAGNOSIS — E55.9 VITAMIN D DEFICIENCY: ICD-10-CM

## 2018-08-14 DIAGNOSIS — E11.69 DM TYPE 2 WITH DIABETIC DYSLIPIDEMIA (HCC): Primary | ICD-10-CM

## 2018-08-14 DIAGNOSIS — R53.83 FATIGUE, UNSPECIFIED TYPE: ICD-10-CM

## 2018-08-14 PROCEDURE — 2022F DILAT RTA XM EVC RTNOPTHY: CPT | Performed by: FAMILY MEDICINE

## 2018-08-14 PROCEDURE — G8427 DOCREV CUR MEDS BY ELIG CLIN: HCPCS | Performed by: FAMILY MEDICINE

## 2018-08-14 PROCEDURE — 3017F COLORECTAL CA SCREEN DOC REV: CPT | Performed by: FAMILY MEDICINE

## 2018-08-14 PROCEDURE — 3045F PR MOST RECENT HEMOGLOBIN A1C LEVEL 7.0-9.0%: CPT | Performed by: FAMILY MEDICINE

## 2018-08-14 PROCEDURE — 99212 OFFICE O/P EST SF 10 MIN: CPT | Performed by: FAMILY MEDICINE

## 2018-08-14 PROCEDURE — G8417 CALC BMI ABV UP PARAM F/U: HCPCS | Performed by: FAMILY MEDICINE

## 2018-08-14 PROCEDURE — 1036F TOBACCO NON-USER: CPT | Performed by: FAMILY MEDICINE

## 2018-08-14 ASSESSMENT — ENCOUNTER SYMPTOMS
CONSTIPATION: 0
SHORTNESS OF BREATH: 0
DIARRHEA: 0
EYE PAIN: 0
NAUSEA: 0
CHOKING: 0
APNEA: 0
EYE DISCHARGE: 0
CHEST TIGHTNESS: 0
WHEEZING: 0
ABDOMINAL PAIN: 0
PHOTOPHOBIA: 0
STRIDOR: 0
EYE REDNESS: 0
FACIAL SWELLING: 0
COLOR CHANGE: 0

## 2018-08-14 NOTE — PROGRESS NOTES
Subjective:      Patient ID: Lewis Lee is a 63 y.o. male who presents today for:  Chief Complaint   Patient presents with    Diabetes     Patient presents today for follow up for DM.  Health Maintenance     Patient would like to discuss vaccines. Diabetes   He presents for his follow-up diabetic visit. He has type 2 diabetes mellitus. His disease course has been stable. There are no hypoglycemic associated symptoms. Pertinent negatives for hypoglycemia include no confusion, dizziness, headaches, nervousness/anxiousness, pallor, speech difficulty or tremors. There are no diabetic associated symptoms. Pertinent negatives for diabetes include no chest pain, no polydipsia and no polyphagia. There are no hypoglycemic complications. Symptoms are stable. There are no diabetic complications. Risk factors for coronary artery disease include male sex, dyslipidemia and diabetes mellitus. Current diabetic treatments: Januvia, Glyburide, Actos, Metformin, Tradjenta. He is compliant with treatment all of the time. His weight is stable. He is following a generally healthy diet. Hyperlipidemia   This is a chronic problem. The current episode started more than 1 year ago. The problem is controlled. Recent lipid tests were reviewed and are normal. Exacerbating diseases include diabetes. He has no history of chronic renal disease, hypothyroidism, liver disease, obesity or nephrotic syndrome. There are no known factors aggravating his hyperlipidemia. Pertinent negatives include no chest pain, focal sensory loss, focal weakness, leg pain, myalgias or shortness of breath. Treatments tried: Lipitor. The current treatment provides moderate improvement of lipids. There are no compliance problems. Risk factors for coronary artery disease include male sex, dyslipidemia and diabetes mellitus.        Past Medical History:   Diagnosis Date    DM (diabetes mellitus screen)      Past Surgical History:   Procedure Laterality Date Cuff Size: Large Adult)   Pulse 88   Temp 98.1 °F (36.7 °C) (Temporal)   Resp 14   Ht 5' 9\" (1.753 m)   Wt 185 lb 11.2 oz (84.2 kg)   SpO2 97%   BMI 27.42 kg/m²     Physical Exam      PHYSICAL EXAMINATION:   VITAL SIGNS: are as recorded. GENERAL:  The patient appears well nourished and well-developed, and with normal affect. No acute respiratory distress. Alert and oriented times 3. No skin rashes. HEENT:  TMs normal bilaterally. Canals and ears normal. Pharynx is clear. Extraocular eye motions intact and pain free. Pupils reactive and equally round. Sclerae and conjunctivae clear, normocephalic and atraumatic. RESPIRATORY:  Clear and equal breath sounds with no acute respiratory distress. HEART: Regular rhythm without murmur, rub or gallop. ABDOMEN:  soft, nontender. No masses, guarding or rebound. Normoactive bowel sounds. NECK: No masses or adenopathy palpable. No bruits heard. No asymmetry visible. LOW BACK: No tenderness to palpation of inferior lumbar spine or either sacroiliac joint area. Assessment:      Diagnosis Orders   1. DM type 2 with diabetic dyslipidemia (HCC)  Hemoglobin A1C    linagliptin (TRADJENTA) 5 MG tablet    SITagliptin (JANUVIA) 100 MG tablet   2. Hyperlipidemia, unspecified hyperlipidemia type  Lipid Panel   3. Fatigue, unspecified type  CBC Auto Differential    Comprehensive Metabolic Panel   4.  Vitamin D deficiency  Vitamin D 25 Hydroxy       Plan:      Orders Placed This Encounter   Procedures    CBC Auto Differential     Standing Status:   Future     Standing Expiration Date:   8/14/2019    Comprehensive Metabolic Panel     Standing Status:   Future     Standing Expiration Date:   8/14/2019    Lipid Panel     Standing Status:   Future     Standing Expiration Date:   11/14/2018     Order Specific Question:   Is Patient Fasting?/# of Hours     Answer:   yes    Hemoglobin A1C     Standing Status:   Future     Standing Expiration Date:

## 2018-09-10 ENCOUNTER — TELEPHONE (OUTPATIENT)
Dept: PRIMARY CARE CLINIC | Age: 58
End: 2018-09-10

## 2018-09-10 DIAGNOSIS — E11.69 DM TYPE 2 WITH DIABETIC DYSLIPIDEMIA (HCC): ICD-10-CM

## 2018-09-10 DIAGNOSIS — E78.5 DM TYPE 2 WITH DIABETIC DYSLIPIDEMIA (HCC): ICD-10-CM

## 2018-09-10 NOTE — TELEPHONE ENCOUNTER
Rhonda King from [facility] is asking about his metformin. How many is he supposed to take daily? His # is [phone].

## 2018-09-10 NOTE — TELEPHONE ENCOUNTER
Inzara Familia was notified and he is asking if he should take 1 pill 4 times daily or 2 pills twice daily or take all at once?

## 2018-10-06 ENCOUNTER — NURSE ONLY (OUTPATIENT)
Dept: PRIMARY CARE CLINIC | Age: 58
End: 2018-10-06
Payer: COMMERCIAL

## 2018-10-06 DIAGNOSIS — Z23 NEED FOR TETANUS, DIPHTHERIA, AND ACELLULAR PERTUSSIS (TDAP) VACCINE: Primary | ICD-10-CM

## 2018-10-06 PROCEDURE — 90715 TDAP VACCINE 7 YRS/> IM: CPT | Performed by: PHYSICIAN ASSISTANT

## 2018-10-06 PROCEDURE — 90471 IMMUNIZATION ADMIN: CPT | Performed by: PHYSICIAN ASSISTANT

## 2018-10-18 DIAGNOSIS — E78.5 HYPERLIPIDEMIA, UNSPECIFIED HYPERLIPIDEMIA TYPE: ICD-10-CM

## 2018-10-18 DIAGNOSIS — R53.83 FATIGUE, UNSPECIFIED TYPE: ICD-10-CM

## 2018-10-18 DIAGNOSIS — E78.5 DM TYPE 2 WITH DIABETIC DYSLIPIDEMIA (HCC): ICD-10-CM

## 2018-10-18 DIAGNOSIS — E11.69 DM TYPE 2 WITH DIABETIC DYSLIPIDEMIA (HCC): ICD-10-CM

## 2018-10-18 DIAGNOSIS — E55.9 VITAMIN D DEFICIENCY: ICD-10-CM

## 2018-10-18 LAB
ALBUMIN SERPL-MCNC: 4.7 G/DL (ref 3.9–4.9)
ALP BLD-CCNC: 61 U/L (ref 35–104)
ALT SERPL-CCNC: 16 U/L (ref 0–41)
ANION GAP SERPL CALCULATED.3IONS-SCNC: 14 MEQ/L (ref 7–13)
AST SERPL-CCNC: 19 U/L (ref 0–40)
BASOPHILS ABSOLUTE: 0 K/UL (ref 0–0.2)
BASOPHILS RELATIVE PERCENT: 0.8 %
BILIRUB SERPL-MCNC: 1 MG/DL (ref 0–1.2)
BUN BLDV-MCNC: 11 MG/DL (ref 6–20)
CALCIUM SERPL-MCNC: 9.5 MG/DL (ref 8.6–10.2)
CHLORIDE BLD-SCNC: 99 MEQ/L (ref 98–107)
CHOLESTEROL, TOTAL: 117 MG/DL (ref 0–199)
CO2: 26 MEQ/L (ref 22–29)
CREAT SERPL-MCNC: 0.61 MG/DL (ref 0.7–1.2)
EOSINOPHILS ABSOLUTE: 0.3 K/UL (ref 0–0.7)
EOSINOPHILS RELATIVE PERCENT: 4.7 %
GFR AFRICAN AMERICAN: >60
GFR NON-AFRICAN AMERICAN: >60
GLOBULIN: 2.5 G/DL (ref 2.3–3.5)
GLUCOSE BLD-MCNC: 89 MG/DL (ref 74–109)
HBA1C MFR BLD: 7.4 % (ref 4.8–5.9)
HCT VFR BLD CALC: 41.6 % (ref 42–52)
HDLC SERPL-MCNC: 56 MG/DL (ref 40–59)
HEMOGLOBIN: 13.7 G/DL (ref 14–18)
LDL CHOLESTEROL CALCULATED: 37 MG/DL (ref 0–129)
LYMPHOCYTES ABSOLUTE: 2.3 K/UL (ref 1–4.8)
LYMPHOCYTES RELATIVE PERCENT: 37.2 %
MCH RBC QN AUTO: 30.5 PG (ref 27–31.3)
MCHC RBC AUTO-ENTMCNC: 33 % (ref 33–37)
MCV RBC AUTO: 92.2 FL (ref 80–100)
MONOCYTES ABSOLUTE: 0.6 K/UL (ref 0.2–0.8)
MONOCYTES RELATIVE PERCENT: 9 %
NEUTROPHILS ABSOLUTE: 3 K/UL (ref 1.4–6.5)
NEUTROPHILS RELATIVE PERCENT: 48.3 %
PDW BLD-RTO: 13.2 % (ref 11.5–14.5)
PLATELET # BLD: 257 K/UL (ref 130–400)
POTASSIUM SERPL-SCNC: 3.9 MEQ/L (ref 3.5–5.1)
RBC # BLD: 4.51 M/UL (ref 4.7–6.1)
SODIUM BLD-SCNC: 139 MEQ/L (ref 132–144)
TOTAL PROTEIN: 7.2 G/DL (ref 6.4–8.1)
TRIGL SERPL-MCNC: 120 MG/DL (ref 0–200)
VITAMIN D 25-HYDROXY: 34.3 NG/ML (ref 30–100)
WBC # BLD: 6.2 K/UL (ref 4.8–10.8)

## 2018-10-23 ENCOUNTER — TELEPHONE (OUTPATIENT)
Dept: PRIMARY CARE CLINIC | Age: 58
End: 2018-10-23

## 2018-10-23 ENCOUNTER — OFFICE VISIT (OUTPATIENT)
Dept: PRIMARY CARE CLINIC | Age: 58
End: 2018-10-23
Payer: COMMERCIAL

## 2018-10-23 VITALS
WEIGHT: 190 LBS | HEART RATE: 82 BPM | HEIGHT: 69 IN | OXYGEN SATURATION: 97 % | DIASTOLIC BLOOD PRESSURE: 80 MMHG | RESPIRATION RATE: 16 BRPM | SYSTOLIC BLOOD PRESSURE: 116 MMHG | BODY MASS INDEX: 28.14 KG/M2 | TEMPERATURE: 97.7 F

## 2018-10-23 DIAGNOSIS — Z23 FLU VACCINE NEED: ICD-10-CM

## 2018-10-23 DIAGNOSIS — E78.5 HYPERLIPIDEMIA, UNSPECIFIED HYPERLIPIDEMIA TYPE: ICD-10-CM

## 2018-10-23 DIAGNOSIS — E11.69 DM TYPE 2 WITH DIABETIC DYSLIPIDEMIA (HCC): Primary | ICD-10-CM

## 2018-10-23 DIAGNOSIS — Z12.11 SCREENING FOR COLON CANCER: ICD-10-CM

## 2018-10-23 DIAGNOSIS — E78.5 DM TYPE 2 WITH DIABETIC DYSLIPIDEMIA (HCC): Primary | ICD-10-CM

## 2018-10-23 DIAGNOSIS — E55.9 VITAMIN D DEFICIENCY: ICD-10-CM

## 2018-10-23 PROCEDURE — G8417 CALC BMI ABV UP PARAM F/U: HCPCS | Performed by: FAMILY MEDICINE

## 2018-10-23 PROCEDURE — 90471 IMMUNIZATION ADMIN: CPT | Performed by: FAMILY MEDICINE

## 2018-10-23 PROCEDURE — G8482 FLU IMMUNIZE ORDER/ADMIN: HCPCS | Performed by: FAMILY MEDICINE

## 2018-10-23 PROCEDURE — 99212 OFFICE O/P EST SF 10 MIN: CPT | Performed by: FAMILY MEDICINE

## 2018-10-23 PROCEDURE — G8427 DOCREV CUR MEDS BY ELIG CLIN: HCPCS | Performed by: FAMILY MEDICINE

## 2018-10-23 PROCEDURE — 1036F TOBACCO NON-USER: CPT | Performed by: FAMILY MEDICINE

## 2018-10-23 PROCEDURE — 3017F COLORECTAL CA SCREEN DOC REV: CPT | Performed by: FAMILY MEDICINE

## 2018-10-23 PROCEDURE — 2022F DILAT RTA XM EVC RTNOPTHY: CPT | Performed by: FAMILY MEDICINE

## 2018-10-23 PROCEDURE — 3045F PR MOST RECENT HEMOGLOBIN A1C LEVEL 7.0-9.0%: CPT | Performed by: FAMILY MEDICINE

## 2018-10-23 PROCEDURE — 90688 IIV4 VACCINE SPLT 0.5 ML IM: CPT | Performed by: FAMILY MEDICINE

## 2018-10-23 RX ORDER — CHOLECALCIFEROL (VITAMIN D3) 50 MCG
2000 TABLET ORAL DAILY
Qty: 90 TABLET | Refills: 1 | Status: SHIPPED | OUTPATIENT
Start: 2018-10-23 | End: 2019-04-03 | Stop reason: SDUPTHER

## 2018-10-23 RX ORDER — ATORVASTATIN CALCIUM 10 MG/1
10 TABLET, FILM COATED ORAL DAILY
Qty: 90 TABLET | Refills: 0 | Status: SHIPPED | OUTPATIENT
Start: 2018-10-23 | End: 2019-02-04 | Stop reason: SDUPTHER

## 2018-10-23 RX ORDER — METFORMIN HYDROCHLORIDE 500 MG/1
500 TABLET, EXTENDED RELEASE ORAL
Qty: 360 TABLET | Refills: 1 | Status: SHIPPED | OUTPATIENT
Start: 2018-10-23 | End: 2019-04-03 | Stop reason: SDUPTHER

## 2018-10-23 RX ORDER — LOSARTAN POTASSIUM 25 MG/1
25 TABLET ORAL DAILY
Qty: 90 TABLET | Refills: 0 | Status: SHIPPED | OUTPATIENT
Start: 2018-10-23 | End: 2019-02-04 | Stop reason: SDUPTHER

## 2018-10-23 RX ORDER — GLYBURIDE 2.5 MG/1
2.5 TABLET ORAL
Qty: 90 TABLET | Refills: 1 | Status: SHIPPED | OUTPATIENT
Start: 2018-10-23 | End: 2019-01-23 | Stop reason: SDUPTHER

## 2018-10-23 RX ORDER — PIOGLITAZONEHYDROCHLORIDE 45 MG/1
45 TABLET ORAL DAILY
Qty: 90 TABLET | Refills: 1 | Status: SHIPPED | OUTPATIENT
Start: 2018-10-23 | End: 2019-01-23

## 2018-10-23 ASSESSMENT — ENCOUNTER SYMPTOMS
APNEA: 0
CONSTIPATION: 0
ABDOMINAL PAIN: 0
COLOR CHANGE: 0
EYE DISCHARGE: 0
DIARRHEA: 0
FACIAL SWELLING: 0
STRIDOR: 0
EYE PAIN: 0
SHORTNESS OF BREATH: 0
CHEST TIGHTNESS: 0
WHEEZING: 0
EYE REDNESS: 0
CHOKING: 0
NAUSEA: 0
PHOTOPHOBIA: 0

## 2018-10-23 NOTE — PROGRESS NOTES
focal weakness, syncope, facial asymmetry, speech difficulty, light-headedness and headaches. Psychiatric/Behavioral: Negative for confusion and hallucinations. The patient is not nervous/anxious and is not hyperactive. Objective:   /80   Pulse 82   Temp 97.7 °F (36.5 °C) (Oral)   Resp 16   Ht 5' 9\" (1.753 m)   Wt 190 lb (86.2 kg)   SpO2 97%   BMI 28.06 kg/m²     Physical Exam      PHYSICAL EXAMINATION:   VITAL SIGNS: are as recorded. GENERAL:  The patient appears well nourished and well-developed, and with normal affect. No acute respiratory distress. Alert and oriented times 3. No skin rashes. HEENT:  TMs normal bilaterally. Canals and ears normal. Pharynx is clear. Extraocular eye motions intact and pain free. Pupils reactive and equally round. Sclerae and conjunctivae clear, normocephalic and atraumatic. RESPIRATORY:  Clear and equal breath sounds with no acute respiratory distress. HEART: Regular rhythm without murmur, rub or gallop. ABDOMEN:  soft, nontender. No masses, guarding or rebound. Normoactive bowel sounds. NECK: No masses or adenopathy palpable. No bruits heard. No asymmetry visible. COMPLETE EXTREMITIES: No edema, all four extremities with posterior tibial and radial pulses strong and palpable. EXTREMITIES:  no edema in any extremity. No cyanosis or clubbing. LOW BACK: No tenderness to palpation of inferior lumbar spine or either sacroiliac joint area. Assessment:      Diagnosis Orders   1. DM type 2 with diabetic dyslipidemia (HCC)  linagliptin (TRADJENTA) 5 MG tablet    losartan (COZAAR) 25 MG tablet    glyBURIDE (DIABETA) 2.5 MG tablet    metFORMIN (GLUCOPHAGE XR) 500 MG extended release tablet    pioglitazone (ACTOS) 45 MG tablet   2. Hyperlipidemia, unspecified hyperlipidemia type     3. Vitamin D deficiency  Cholecalciferol (VITAMIN D) 2000 units TABS tablet   4. Flu vaccine need     5.  Screening for colon cancer  POCT Fecal

## 2019-01-07 ENCOUNTER — TELEPHONE (OUTPATIENT)
Dept: PRIMARY CARE CLINIC | Age: 59
End: 2019-01-07

## 2019-01-23 ENCOUNTER — OFFICE VISIT (OUTPATIENT)
Dept: PRIMARY CARE CLINIC | Age: 59
End: 2019-01-23
Payer: COMMERCIAL

## 2019-01-23 VITALS
DIASTOLIC BLOOD PRESSURE: 80 MMHG | OXYGEN SATURATION: 98 % | WEIGHT: 192 LBS | TEMPERATURE: 98.6 F | SYSTOLIC BLOOD PRESSURE: 120 MMHG | HEIGHT: 69 IN | RESPIRATION RATE: 16 BRPM | BODY MASS INDEX: 28.44 KG/M2 | HEART RATE: 78 BPM

## 2019-01-23 DIAGNOSIS — E78.5 HYPERLIPIDEMIA, UNSPECIFIED HYPERLIPIDEMIA TYPE: ICD-10-CM

## 2019-01-23 DIAGNOSIS — E78.5 DM TYPE 2 WITH DIABETIC DYSLIPIDEMIA (HCC): ICD-10-CM

## 2019-01-23 DIAGNOSIS — E78.5 DM TYPE 2 WITH DIABETIC DYSLIPIDEMIA (HCC): Primary | ICD-10-CM

## 2019-01-23 DIAGNOSIS — Z12.11 SCREENING FOR COLON CANCER: ICD-10-CM

## 2019-01-23 DIAGNOSIS — Z23 NEED FOR PNEUMOCOCCAL VACCINATION: ICD-10-CM

## 2019-01-23 DIAGNOSIS — R53.83 FATIGUE, UNSPECIFIED TYPE: ICD-10-CM

## 2019-01-23 DIAGNOSIS — E11.69 DM TYPE 2 WITH DIABETIC DYSLIPIDEMIA (HCC): ICD-10-CM

## 2019-01-23 DIAGNOSIS — E11.69 DM TYPE 2 WITH DIABETIC DYSLIPIDEMIA (HCC): Primary | ICD-10-CM

## 2019-01-23 LAB
CREATININE URINE: 43.3 MG/DL
GLUCOSE BLD-MCNC: 121 MG/DL
HBA1C MFR BLD: 6.1 %
MICROALBUMIN UR-MCNC: <1.2 MG/DL
MICROALBUMIN/CREAT UR-RTO: NORMAL MG/G (ref 0–30)

## 2019-01-23 PROCEDURE — 99214 OFFICE O/P EST MOD 30 MIN: CPT | Performed by: FAMILY MEDICINE

## 2019-01-23 PROCEDURE — G8482 FLU IMMUNIZE ORDER/ADMIN: HCPCS | Performed by: FAMILY MEDICINE

## 2019-01-23 PROCEDURE — G8417 CALC BMI ABV UP PARAM F/U: HCPCS | Performed by: FAMILY MEDICINE

## 2019-01-23 PROCEDURE — 3044F HG A1C LEVEL LT 7.0%: CPT | Performed by: FAMILY MEDICINE

## 2019-01-23 PROCEDURE — 3017F COLORECTAL CA SCREEN DOC REV: CPT | Performed by: FAMILY MEDICINE

## 2019-01-23 PROCEDURE — 2022F DILAT RTA XM EVC RTNOPTHY: CPT | Performed by: FAMILY MEDICINE

## 2019-01-23 PROCEDURE — 82962 GLUCOSE BLOOD TEST: CPT | Performed by: FAMILY MEDICINE

## 2019-01-23 PROCEDURE — 83036 HEMOGLOBIN GLYCOSYLATED A1C: CPT | Performed by: FAMILY MEDICINE

## 2019-01-23 PROCEDURE — 1036F TOBACCO NON-USER: CPT | Performed by: FAMILY MEDICINE

## 2019-01-23 PROCEDURE — G8427 DOCREV CUR MEDS BY ELIG CLIN: HCPCS | Performed by: FAMILY MEDICINE

## 2019-01-23 RX ORDER — GLYBURIDE 2.5 MG/1
2.5 TABLET ORAL 2 TIMES DAILY WITH MEALS
Qty: 180 TABLET | Refills: 1 | Status: SHIPPED | OUTPATIENT
Start: 2019-01-23 | End: 2019-04-03 | Stop reason: SDUPTHER

## 2019-01-23 ASSESSMENT — ENCOUNTER SYMPTOMS: SHORTNESS OF BREATH: 0

## 2019-01-24 ASSESSMENT — ENCOUNTER SYMPTOMS
BACK PAIN: 0
WHEEZING: 0
ABDOMINAL DISTENTION: 0
ABDOMINAL PAIN: 0
APNEA: 0

## 2019-02-04 DIAGNOSIS — E78.5 DM TYPE 2 WITH DIABETIC DYSLIPIDEMIA (HCC): ICD-10-CM

## 2019-02-04 DIAGNOSIS — E11.69 DM TYPE 2 WITH DIABETIC DYSLIPIDEMIA (HCC): ICD-10-CM

## 2019-02-04 RX ORDER — ATORVASTATIN CALCIUM 10 MG/1
10 TABLET, FILM COATED ORAL DAILY
Qty: 90 TABLET | Refills: 1 | Status: SHIPPED | OUTPATIENT
Start: 2019-02-04 | End: 2019-04-03 | Stop reason: SDUPTHER

## 2019-02-04 RX ORDER — LOSARTAN POTASSIUM 25 MG/1
TABLET ORAL
Qty: 90 TABLET | Refills: 1 | Status: SHIPPED | OUTPATIENT
Start: 2019-02-04 | End: 2019-04-03 | Stop reason: SDUPTHER

## 2019-02-14 DIAGNOSIS — E11.69 DM TYPE 2 WITH DIABETIC DYSLIPIDEMIA (HCC): ICD-10-CM

## 2019-02-14 DIAGNOSIS — E78.5 DM TYPE 2 WITH DIABETIC DYSLIPIDEMIA (HCC): ICD-10-CM

## 2019-04-03 ENCOUNTER — OFFICE VISIT (OUTPATIENT)
Dept: FAMILY MEDICINE CLINIC | Age: 59
End: 2019-04-03
Payer: COMMERCIAL

## 2019-04-03 VITALS
OXYGEN SATURATION: 97 % | HEIGHT: 69 IN | RESPIRATION RATE: 16 BRPM | BODY MASS INDEX: 27.85 KG/M2 | WEIGHT: 188 LBS | SYSTOLIC BLOOD PRESSURE: 118 MMHG | DIASTOLIC BLOOD PRESSURE: 68 MMHG | TEMPERATURE: 97.2 F | HEART RATE: 91 BPM

## 2019-04-03 DIAGNOSIS — E11.69 DM TYPE 2 WITH DIABETIC DYSLIPIDEMIA (HCC): Primary | ICD-10-CM

## 2019-04-03 DIAGNOSIS — E34.9 TESTOSTERONE DEFICIENCY: ICD-10-CM

## 2019-04-03 DIAGNOSIS — E78.5 DM TYPE 2 WITH DIABETIC DYSLIPIDEMIA (HCC): Primary | ICD-10-CM

## 2019-04-03 DIAGNOSIS — E78.00 PURE HYPERCHOLESTEROLEMIA: ICD-10-CM

## 2019-04-03 DIAGNOSIS — E55.9 VITAMIN D DEFICIENCY: ICD-10-CM

## 2019-04-03 DIAGNOSIS — N52.9 ERECTILE DYSFUNCTION, UNSPECIFIED ERECTILE DYSFUNCTION TYPE: ICD-10-CM

## 2019-04-03 DIAGNOSIS — I10 ESSENTIAL HYPERTENSION: ICD-10-CM

## 2019-04-03 PROCEDURE — 99214 OFFICE O/P EST MOD 30 MIN: CPT | Performed by: INTERNAL MEDICINE

## 2019-04-03 PROCEDURE — 3017F COLORECTAL CA SCREEN DOC REV: CPT | Performed by: INTERNAL MEDICINE

## 2019-04-03 PROCEDURE — 2022F DILAT RTA XM EVC RTNOPTHY: CPT | Performed by: INTERNAL MEDICINE

## 2019-04-03 PROCEDURE — 1036F TOBACCO NON-USER: CPT | Performed by: INTERNAL MEDICINE

## 2019-04-03 PROCEDURE — G8417 CALC BMI ABV UP PARAM F/U: HCPCS | Performed by: INTERNAL MEDICINE

## 2019-04-03 PROCEDURE — 3044F HG A1C LEVEL LT 7.0%: CPT | Performed by: INTERNAL MEDICINE

## 2019-04-03 PROCEDURE — G8427 DOCREV CUR MEDS BY ELIG CLIN: HCPCS | Performed by: INTERNAL MEDICINE

## 2019-04-03 RX ORDER — CHOLECALCIFEROL (VITAMIN D3) 50 MCG
2000 TABLET ORAL DAILY
Qty: 90 TABLET | Refills: 3 | Status: SHIPPED | OUTPATIENT
Start: 2019-04-03 | End: 2021-03-01 | Stop reason: SDUPTHER

## 2019-04-03 RX ORDER — ATORVASTATIN CALCIUM 10 MG/1
10 TABLET, FILM COATED ORAL DAILY
Qty: 90 TABLET | Refills: 3 | Status: SHIPPED | OUTPATIENT
Start: 2019-04-03 | End: 2020-02-11 | Stop reason: SDUPTHER

## 2019-04-03 RX ORDER — SILDENAFIL CITRATE 20 MG/1
20 TABLET ORAL DAILY PRN
Qty: 10 TABLET | Refills: 5 | Status: SHIPPED | OUTPATIENT
Start: 2019-04-03 | End: 2020-08-24

## 2019-04-03 RX ORDER — METFORMIN HYDROCHLORIDE 500 MG/1
500 TABLET, EXTENDED RELEASE ORAL
Qty: 360 TABLET | Refills: 3 | Status: SHIPPED | OUTPATIENT
Start: 2019-04-03 | End: 2021-03-13 | Stop reason: SDUPTHER

## 2019-04-03 RX ORDER — LOSARTAN POTASSIUM 25 MG/1
TABLET ORAL
Qty: 90 TABLET | Refills: 3 | Status: SHIPPED | OUTPATIENT
Start: 2019-04-03 | End: 2020-02-11 | Stop reason: SDUPTHER

## 2019-04-03 RX ORDER — GLYBURIDE 2.5 MG/1
2.5 TABLET ORAL 2 TIMES DAILY WITH MEALS
Qty: 180 TABLET | Refills: 1 | Status: SHIPPED | OUTPATIENT
Start: 2019-04-03 | End: 2019-11-21 | Stop reason: SDUPTHER

## 2019-04-03 ASSESSMENT — PATIENT HEALTH QUESTIONNAIRE - PHQ9
SUM OF ALL RESPONSES TO PHQ9 QUESTIONS 1 & 2: 0
SUM OF ALL RESPONSES TO PHQ QUESTIONS 1-9: 0
1. LITTLE INTEREST OR PLEASURE IN DOING THINGS: 0
SUM OF ALL RESPONSES TO PHQ QUESTIONS 1-9: 0
2. FEELING DOWN, DEPRESSED OR HOPELESS: 0

## 2019-04-03 NOTE — TELEPHONE ENCOUNTER
Felipe Christensen from [facility] calling regarding RX sig for Metformin XR 500mg.  Directions states once daily with breakfast but [facility] notes that it has been QID in the past.    Please review and let pharmacy know [phone]    Thank you

## 2019-04-03 NOTE — PROGRESS NOTES
hortensia Lezama 63 y.o. male presents today with   Chief Complaint   Patient presents with    Diabetes     PT. is here today for a 2 month follow up with DM. He is currently checking sugar at home, last a1c  done 1/23/19 6.1, He takes metform, [place] [facility] [place], and glyburide with moderate relief, no side effects to report at this time.  Health Maintenance     PT. denies ppsv23, he has a cologuard at home. Diabetes   He presents for his follow-up diabetic visit. He has type 2 diabetes mellitus. His disease course has been stable. Pertinent negatives for hypoglycemia include no confusion, dizziness or tremors. Pertinent negatives for diabetes include no blurred vision, no chest pain and no fatigue. Pertinent negatives for hypoglycemia complications include no blackouts. Symptoms are stable. Hyperlipidemia   This is a chronic problem. The current episode started more than 1 year ago. The problem is controlled. Recent lipid tests were reviewed and are normal. Pertinent negatives include no chest pain or shortness of breath. Current antihyperlipidemic treatment includes statins. The current treatment provides moderate improvement of lipids. Hypertension   This is a chronic problem. The current episode started more than 1 year ago. The problem is unchanged. The problem is controlled. Associated symptoms include anxiety. Pertinent negatives include no blurred vision, chest pain, neck pain, palpitations or shortness of breath.        Past Medical History:   Diagnosis Date    DM (diabetes mellitus screen)      Patient Active Problem List    Diagnosis Date Noted    Vitamin D deficiency 04/24/2018    Osteoarthritis 11/14/2017    Erectile dysfunction 03/21/2017    DM type 2 with diabetic dyslipidemia ([facility] [address] 03/17/2017    Hyperlipidemia 03/17/2017     Past Surgical History:   Procedure Laterality Date    APPENDECTOMY       Family History   Problem Relation Age of Onset    Stroke Father     Arthritis Sister    Jatin Diabetes Sister     Diabetes Brother      Social History     Socioeconomic History    Marital status:      Spouse name: None    Number of children: None    Years of education: None    Highest education level: None   Occupational History    None   Social Needs    Financial resource strain: None    Food insecurity:     Worry: None     Inability: None    Transportation needs:     Medical: None     Non-medical: None   Tobacco Use    Smoking status: Never Smoker    Smokeless tobacco: Never Used   Substance and Sexual Activity    Alcohol use: Yes     Alcohol/week: 3.6 oz     Types: 6 Cans of beer per week    Drug use: No    Sexual activity: None   Lifestyle    Physical activity:     Days per week: None     Minutes per session: None    Stress: None   Relationships    Social connections:     Talks on phone: None     Gets together: None     Attends [employer] service: None     Active member of club or organization: None     Attends meetings of clubs or organizations: None     Relationship status: None    Intimate partner violence:     Fear of current or ex partner: None     Emotionally abused: None     Physically abused: None     Forced sexual activity: None   Other Topics Concern    None   Social History Narrative    None     Allergies   Allergen Reactions    Actos [Pioglitazone]      Dizziness         Review of Systems   Constitutional: Negative for fatigue and fever. HENT: Negative for trouble swallowing and voice change. Eyes: Negative for blurred vision, photophobia and visual disturbance. Respiratory: Negative for choking and shortness of breath. Cardiovascular: Negative for chest pain and palpitations. Gastrointestinal: Negative for nausea and vomiting. Genitourinary: Negative for decreased urine volume, difficulty urinating, testicular pain and urgency. Musculoskeletal: Negative for neck pain. Skin: Negative for rash.    Neurological: Negative for dizziness, tremors and unspecified erectile dysfunction type  -     sildenafil (REVATIO) 20 MG tablet; Take 1 tablet by mouth daily as needed (prn)    *No follow-ups on file.     Ivet Seymour MD

## 2019-04-03 NOTE — TELEPHONE ENCOUNTER
That what dr cartagena records show  It makes more since 2 pills bid, he can take it qid. If qid why is he on the rx or xr??? Which is cheaper or less side effects.

## 2019-04-04 RX ORDER — METFORMIN HYDROCHLORIDE 500 MG/1
500 TABLET, EXTENDED RELEASE ORAL 4 TIMES DAILY
Qty: 360 TABLET | Refills: 3 | Status: SHIPPED | OUTPATIENT
Start: 2019-04-04 | End: 2020-02-11 | Stop reason: SDUPTHER

## 2019-04-04 NOTE — TELEPHONE ENCOUNTER
I spoke with Beth Terrell at [facility]. The problem is that this happened months ago and the problem was fixed verbally but not electronically. Per Beth Terrell the side effects are less with the XR amd perhaps he does get better coverage with the XR. Cynthia Montes that a new RX would be E-scripted. RX pended.  RX pended    Please review and thank you

## 2019-04-08 ASSESSMENT — ENCOUNTER SYMPTOMS
VOICE CHANGE: 0
SHORTNESS OF BREATH: 0
PHOTOPHOBIA: 0
NAUSEA: 0
VOMITING: 0
CHOKING: 0
BLURRED VISION: 0
TROUBLE SWALLOWING: 0

## 2019-09-16 NOTE — TELEPHONE ENCOUNTER
Patient was given samples januvia 100mg and asking if he can get more samples to  if possible  Please advise if we have any available to

## 2019-10-17 ENCOUNTER — TELEPHONE (OUTPATIENT)
Dept: FAMILY MEDICINE CLINIC | Age: 59
End: 2019-10-17

## 2019-11-21 DIAGNOSIS — E11.69 DM TYPE 2 WITH DIABETIC DYSLIPIDEMIA (HCC): ICD-10-CM

## 2019-11-21 DIAGNOSIS — E78.5 DM TYPE 2 WITH DIABETIC DYSLIPIDEMIA (HCC): ICD-10-CM

## 2019-11-21 RX ORDER — GLYBURIDE 2.5 MG/1
TABLET ORAL
Qty: 180 TABLET | Refills: 0 | Status: SHIPPED | OUTPATIENT
Start: 2019-11-21 | End: 2021-10-23 | Stop reason: ALTCHOICE

## 2019-12-18 ENCOUNTER — TELEPHONE (OUTPATIENT)
Dept: PRIMARY CARE CLINIC | Age: 59
End: 2019-12-18

## 2019-12-18 RX ORDER — GLYBURIDE 5 MG/1
5 TABLET ORAL 2 TIMES DAILY WITH MEALS
Qty: 90 TABLET | Refills: 1 | Status: SHIPPED | OUTPATIENT
Start: 2019-12-18 | End: 2020-02-11 | Stop reason: SDUPTHER

## 2020-01-21 ENCOUNTER — TELEPHONE (OUTPATIENT)
Dept: FAMILY MEDICINE CLINIC | Age: 60
End: 2020-01-21

## 2020-01-30 ENCOUNTER — TELEPHONE (OUTPATIENT)
Dept: PRIMARY CARE CLINIC | Age: 60
End: 2020-01-30

## 2020-01-30 NOTE — TELEPHONE ENCOUNTER
Pt had labs ordered in April 2019 by you, and wants to know if he can still have those done. He also would like to have an appt to see Dr Alla Cohen, so if he can have the labs and then a referral that would be great. I tried to make an appt but he just wants labs first and then the referral for worsening diabetes. Please advise. Also he wants to know if you can call [facility] and get him Januvia or tradjenta samples and he can pick them up over there.

## 2020-02-04 DIAGNOSIS — E55.9 VITAMIN D DEFICIENCY: ICD-10-CM

## 2020-02-04 DIAGNOSIS — E34.9 TESTOSTERONE DEFICIENCY: ICD-10-CM

## 2020-02-04 DIAGNOSIS — I10 ESSENTIAL HYPERTENSION: ICD-10-CM

## 2020-02-04 DIAGNOSIS — E78.5 DM TYPE 2 WITH DIABETIC DYSLIPIDEMIA (HCC): ICD-10-CM

## 2020-02-04 DIAGNOSIS — E78.00 PURE HYPERCHOLESTEROLEMIA: ICD-10-CM

## 2020-02-04 DIAGNOSIS — E11.69 DM TYPE 2 WITH DIABETIC DYSLIPIDEMIA (HCC): ICD-10-CM

## 2020-02-04 LAB
ALBUMIN SERPL-MCNC: 4.9 G/DL (ref 3.5–4.6)
ALP BLD-CCNC: 91 U/L (ref 35–104)
ALT SERPL-CCNC: 47 U/L (ref 0–41)
ANION GAP SERPL CALCULATED.3IONS-SCNC: 18 MEQ/L (ref 9–15)
AST SERPL-CCNC: 41 U/L (ref 0–40)
BASOPHILS ABSOLUTE: 0.1 K/UL (ref 0–0.2)
BASOPHILS RELATIVE PERCENT: 0.9 %
BILIRUB SERPL-MCNC: 0.9 MG/DL (ref 0.2–0.7)
BUN BLDV-MCNC: 10 MG/DL (ref 6–20)
CALCIUM SERPL-MCNC: 9.5 MG/DL (ref 8.5–9.9)
CHLORIDE BLD-SCNC: 99 MEQ/L (ref 95–107)
CHOLESTEROL, TOTAL: 109 MG/DL (ref 0–199)
CO2: 25 MEQ/L (ref 20–31)
CREAT SERPL-MCNC: 0.66 MG/DL (ref 0.7–1.2)
EOSINOPHILS ABSOLUTE: 0.2 K/UL (ref 0–0.7)
EOSINOPHILS RELATIVE PERCENT: 3.6 %
GFR AFRICAN AMERICAN: >60
GFR NON-AFRICAN AMERICAN: >60
GLOBULIN: 2.9 G/DL (ref 2.3–3.5)
GLUCOSE BLD-MCNC: 215 MG/DL (ref 70–99)
HBA1C MFR BLD: 9.4 % (ref 4.8–5.9)
HCT VFR BLD CALC: 45.6 % (ref 42–52)
HDLC SERPL-MCNC: 49 MG/DL (ref 40–59)
HEMOGLOBIN: 15.7 G/DL (ref 14–18)
LDL CHOLESTEROL CALCULATED: 33 MG/DL (ref 0–129)
LYMPHOCYTES ABSOLUTE: 2.6 K/UL (ref 1–4.8)
LYMPHOCYTES RELATIVE PERCENT: 40.8 %
MCH RBC QN AUTO: 31.4 PG (ref 27–31.3)
MCHC RBC AUTO-ENTMCNC: 34.4 % (ref 33–37)
MCV RBC AUTO: 91.1 FL (ref 80–100)
MONOCYTES ABSOLUTE: 0.5 K/UL (ref 0.2–0.8)
MONOCYTES RELATIVE PERCENT: 8.2 %
NEUTROPHILS ABSOLUTE: 3 K/UL (ref 1.4–6.5)
NEUTROPHILS RELATIVE PERCENT: 46.5 %
PDW BLD-RTO: 12.9 % (ref 11.5–14.5)
PLATELET # BLD: 285 K/UL (ref 130–400)
POTASSIUM SERPL-SCNC: 4 MEQ/L (ref 3.4–4.9)
RBC # BLD: 5.01 M/UL (ref 4.7–6.1)
SODIUM BLD-SCNC: 142 MEQ/L (ref 135–144)
TOTAL PROTEIN: 7.8 G/DL (ref 6.3–8)
TRIGL SERPL-MCNC: 133 MG/DL (ref 0–150)
VITAMIN D 25-HYDROXY: 39.8 NG/ML (ref 30–100)
WBC # BLD: 6.4 K/UL (ref 4.8–10.8)

## 2020-02-05 LAB
SEX HORMONE BINDING GLOBULIN: 25 NMOL/L (ref 11–80)
TESTOSTERONE FREE PERCENT: 2.1 % (ref 1.6–2.9)
TESTOSTERONE FREE, CALC: 68 PG/ML (ref 47–244)
TESTOSTERONE TOTAL-MALE: 326 NG/DL (ref 300–890)

## 2020-02-11 ENCOUNTER — OFFICE VISIT (OUTPATIENT)
Dept: PRIMARY CARE CLINIC | Age: 60
End: 2020-02-11
Payer: COMMERCIAL

## 2020-02-11 VITALS
RESPIRATION RATE: 14 BRPM | OXYGEN SATURATION: 96 % | HEART RATE: 94 BPM | DIASTOLIC BLOOD PRESSURE: 71 MMHG | HEIGHT: 69 IN | SYSTOLIC BLOOD PRESSURE: 116 MMHG | TEMPERATURE: 97.9 F | BODY MASS INDEX: 27.55 KG/M2 | WEIGHT: 186 LBS

## 2020-02-11 PROCEDURE — 3046F HEMOGLOBIN A1C LEVEL >9.0%: CPT | Performed by: INTERNAL MEDICINE

## 2020-02-11 PROCEDURE — G8484 FLU IMMUNIZE NO ADMIN: HCPCS | Performed by: INTERNAL MEDICINE

## 2020-02-11 PROCEDURE — 3017F COLORECTAL CA SCREEN DOC REV: CPT | Performed by: INTERNAL MEDICINE

## 2020-02-11 PROCEDURE — 1036F TOBACCO NON-USER: CPT | Performed by: INTERNAL MEDICINE

## 2020-02-11 PROCEDURE — G8417 CALC BMI ABV UP PARAM F/U: HCPCS | Performed by: INTERNAL MEDICINE

## 2020-02-11 PROCEDURE — 99213 OFFICE O/P EST LOW 20 MIN: CPT | Performed by: INTERNAL MEDICINE

## 2020-02-11 PROCEDURE — 2022F DILAT RTA XM EVC RTNOPTHY: CPT | Performed by: INTERNAL MEDICINE

## 2020-02-11 PROCEDURE — G8427 DOCREV CUR MEDS BY ELIG CLIN: HCPCS | Performed by: INTERNAL MEDICINE

## 2020-02-11 RX ORDER — ATORVASTATIN CALCIUM 10 MG/1
10 TABLET, FILM COATED ORAL DAILY
Qty: 90 TABLET | Refills: 3 | Status: SHIPPED | OUTPATIENT
Start: 2020-02-11 | End: 2021-03-01 | Stop reason: SDUPTHER

## 2020-02-11 RX ORDER — METFORMIN HYDROCHLORIDE 500 MG/1
500 TABLET, EXTENDED RELEASE ORAL 4 TIMES DAILY
Qty: 360 TABLET | Refills: 3 | Status: SHIPPED | OUTPATIENT
Start: 2020-02-11 | End: 2021-03-01 | Stop reason: SDUPTHER

## 2020-02-11 RX ORDER — LOSARTAN POTASSIUM 25 MG/1
TABLET ORAL
Qty: 90 TABLET | Refills: 3 | Status: SHIPPED | OUTPATIENT
Start: 2020-02-11 | End: 2021-03-01 | Stop reason: SDUPTHER

## 2020-02-11 RX ORDER — GLYBURIDE 5 MG/1
10 TABLET ORAL 2 TIMES DAILY WITH MEALS
Qty: 360 TABLET | Refills: 3 | Status: SHIPPED | OUTPATIENT
Start: 2020-02-11 | End: 2021-03-15

## 2020-02-11 SDOH — ECONOMIC STABILITY: FOOD INSECURITY: WITHIN THE PAST 12 MONTHS, YOU WORRIED THAT YOUR FOOD WOULD RUN OUT BEFORE YOU GOT MONEY TO BUY MORE.: NEVER TRUE

## 2020-02-11 SDOH — ECONOMIC STABILITY: FOOD INSECURITY: WITHIN THE PAST 12 MONTHS, THE FOOD YOU BOUGHT JUST DIDN'T LAST AND YOU DIDN'T HAVE MONEY TO GET MORE.: NEVER TRUE

## 2020-02-11 SDOH — ECONOMIC STABILITY: INCOME INSECURITY: HOW HARD IS IT FOR YOU TO PAY FOR THE VERY BASICS LIKE FOOD, HOUSING, MEDICAL CARE, AND HEATING?: NOT HARD AT ALL

## 2020-02-11 ASSESSMENT — PATIENT HEALTH QUESTIONNAIRE - PHQ9
SUM OF ALL RESPONSES TO PHQ QUESTIONS 1-9: 0
SUM OF ALL RESPONSES TO PHQ9 QUESTIONS 1 & 2: 0
2. FEELING DOWN, DEPRESSED OR HOPELESS: 0
1. LITTLE INTEREST OR PLEASURE IN DOING THINGS: 0
SUM OF ALL RESPONSES TO PHQ QUESTIONS 1-9: 0

## 2020-02-11 NOTE — PROGRESS NOTES
Gerard Canas 63 y.o. male presents today with   Chief Complaint   Patient presents with    2 Week Follow-Up     Review lab work from 2/4/2020    Diabetes     Patient here for follow up on diabetes, A1C 9.4. Patient taking Metformin, Glyburide. Not taking Januvia due to how expensive it is.  Health Maintenance     Patient denies colonoscopy, Has cologuard       Diabetes   He presents for his follow-up diabetic visit. He has type 2 diabetes mellitus. His disease course has been stable. Pertinent negatives for hypoglycemia include no confusion, dizziness, headaches or tremors. Pertinent negatives for diabetes include no blurred vision, no chest pain and no fatigue. Pertinent negatives for hypoglycemia complications include no blackouts. Symptoms are stable. Hyperlipidemia   This is a chronic problem. The current episode started more than 1 year ago. The problem is controlled. Recent lipid tests were reviewed and are normal. Pertinent negatives include no chest pain or shortness of breath. Hypertension   This is a chronic problem. The current episode started more than 1 year ago. The problem is unchanged. The problem is controlled. Pertinent negatives include no blurred vision, chest pain, headaches, palpitations or shortness of breath.     of januvia before doing test x 3 weeks    Past Medical History:   Diagnosis Date    DM (diabetes mellitus screen)      Patient Active Problem List    Diagnosis Date Noted    Vitamin D deficiency 04/24/2018    Osteoarthritis 11/14/2017    Erectile dysfunction 03/21/2017    DM type 2 with diabetic dyslipidemia ([facility] [address] 03/17/2017    Hyperlipidemia 03/17/2017     Past Surgical History:   Procedure Laterality Date    APPENDECTOMY       Family History   Problem Relation Age of Onset    Stroke Father     Arthritis Sister     Diabetes Sister     Diabetes Brother      Social History     Socioeconomic History    Marital status:      Spouse name: None    Number of

## 2020-02-17 ASSESSMENT — ENCOUNTER SYMPTOMS
VOMITING: 0
TROUBLE SWALLOWING: 0
BLURRED VISION: 0
CHOKING: 0
SHORTNESS OF BREATH: 0
PHOTOPHOBIA: 0
NAUSEA: 0
VOICE CHANGE: 0

## 2020-07-16 ENCOUNTER — TELEPHONE (OUTPATIENT)
Dept: PRIMARY CARE CLINIC | Age: 60
End: 2020-07-16

## 2020-07-30 DIAGNOSIS — E78.5 DM TYPE 2 WITH DIABETIC DYSLIPIDEMIA (HCC): ICD-10-CM

## 2020-07-30 DIAGNOSIS — E11.69 DM TYPE 2 WITH DIABETIC DYSLIPIDEMIA (HCC): ICD-10-CM

## 2020-07-30 DIAGNOSIS — Z12.5 PROSTATE CANCER SCREENING: ICD-10-CM

## 2020-07-30 LAB
ALBUMIN SERPL-MCNC: 4.6 G/DL (ref 3.5–4.6)
ALP BLD-CCNC: 74 U/L (ref 35–104)
ALT SERPL-CCNC: 22 U/L (ref 0–41)
ANION GAP SERPL CALCULATED.3IONS-SCNC: 16 MEQ/L (ref 9–15)
AST SERPL-CCNC: 26 U/L (ref 0–40)
BILIRUB SERPL-MCNC: 1.4 MG/DL (ref 0.2–0.7)
BUN BLDV-MCNC: 11 MG/DL (ref 6–20)
CALCIUM SERPL-MCNC: 9.3 MG/DL (ref 8.5–9.9)
CHLORIDE BLD-SCNC: 101 MEQ/L (ref 95–107)
CO2: 22 MEQ/L (ref 20–31)
CREAT SERPL-MCNC: 0.57 MG/DL (ref 0.7–1.2)
GFR AFRICAN AMERICAN: >60
GFR NON-AFRICAN AMERICAN: >60
GLOBULIN: 2.7 G/DL (ref 2.3–3.5)
GLUCOSE BLD-MCNC: 179 MG/DL (ref 70–99)
HBA1C MFR BLD: 8.8 % (ref 4.8–5.9)
POTASSIUM SERPL-SCNC: 4.1 MEQ/L (ref 3.4–4.9)
PROSTATE SPECIFIC ANTIGEN: 2.58 NG/ML (ref 0–5.4)
SODIUM BLD-SCNC: 139 MEQ/L (ref 135–144)
TOTAL PROTEIN: 7.3 G/DL (ref 6.3–8)

## 2020-08-24 ENCOUNTER — OFFICE VISIT (OUTPATIENT)
Dept: PRIMARY CARE CLINIC | Age: 60
End: 2020-08-24
Payer: COMMERCIAL

## 2020-08-24 VITALS
HEIGHT: 69 IN | DIASTOLIC BLOOD PRESSURE: 81 MMHG | RESPIRATION RATE: 14 BRPM | BODY MASS INDEX: 26.81 KG/M2 | TEMPERATURE: 97.4 F | SYSTOLIC BLOOD PRESSURE: 120 MMHG | OXYGEN SATURATION: 97 % | WEIGHT: 181 LBS | HEART RATE: 83 BPM

## 2020-08-24 PROCEDURE — 99396 PREV VISIT EST AGE 40-64: CPT | Performed by: INTERNAL MEDICINE

## 2020-08-24 RX ORDER — SILDENAFIL 100 MG/1
100 TABLET, FILM COATED ORAL PRN
Qty: 10 TABLET | Refills: 11 | Status: SHIPPED | OUTPATIENT
Start: 2020-08-24 | End: 2021-10-23

## 2020-08-24 NOTE — PROGRESS NOTES
Henny Page 62 y.o. male presents today with   Chief Complaint   Patient presents with    Check-Up       Hypertension   This is a chronic problem. The current episode started more than 1 year ago. The problem is unchanged. The problem is controlled. Associated symptoms include anxiety. Pertinent negatives include no blurred vision, chest pain, headaches, palpitations or shortness of breath. Diabetes   He presents for his follow-up diabetic visit. He has type 2 diabetes mellitus. His disease course has been stable. Pertinent negatives for hypoglycemia include no headaches or tremors. Pertinent negatives for diabetes include no blurred vision, no chest pain and no fatigue. Past Medical History:   Diagnosis Date    DM (diabetes mellitus screen)      Patient Active Problem List    Diagnosis Date Noted    Vitamin D deficiency 04/24/2018    Osteoarthritis 11/14/2017    Erectile dysfunction 03/21/2017    DM type 2 with diabetic dyslipidemia ([facility] [address] 03/17/2017    Hyperlipidemia 03/17/2017     Past Surgical History:   Procedure Laterality Date    APPENDECTOMY       Family History   Problem Relation Age of Onset    Stroke Father     Arthritis Sister     Diabetes Sister     Diabetes Brother      Social History     Socioeconomic History    Marital status:      Spouse name: None    Number of children: None    Years of education: None    Highest education level: None   Occupational History    None   Social Needs    Financial resource strain: Not hard at all   [employer] insecurity     Worry: Never true     Inability: Never true   [email or web address] needs     Medical: None     Non-medical: None   Tobacco Use    Smoking status: Never Smoker    Smokeless tobacco: Never Used   Substance and Sexual Activity    Alcohol use:  Yes     Alcohol/week: 6.0 standard drinks     Types: 6 Cans of beer per week    Drug use: No    Sexual activity: None   Lifestyle    Physical activity     Days per week: None Minutes per session: None    Stress: None   Relationships    Social connections     Talks on phone: None     Gets together: None     Attends Mormonism service: None     Active member of club or organization: None     Attends meetings of clubs or organizations: None     Relationship status: None    Intimate partner violence     Fear of current or ex partner: None     Emotionally abused: None     Physically abused: None     Forced sexual activity: None   Other Topics Concern    None   Social History Narrative    None     Allergies   Allergen Reactions    Actos [Pioglitazone]      Dizziness         Review of Systems   Constitutional: Negative for fatigue and fever. HENT: Negative for trouble swallowing and voice change. Eyes: Negative for blurred vision, photophobia and visual disturbance. Respiratory: Negative for choking and shortness of breath. Cardiovascular: Negative for chest pain and palpitations. Gastrointestinal: Negative for nausea and vomiting. Genitourinary: Negative for decreased urine volume and urgency. Skin: Negative for rash. Neurological: Negative for tremors, syncope and headaches. Hematological: Does not bruise/bleed easily. Psychiatric/Behavioral: Negative for suicidal ideas. Vitals:    08/24/20 1029   BP: 120/81   Pulse: 83   Resp: 14   Temp: 97.4 °F (36.3 °C)   SpO2: 97%   Weight: 181 lb (82.1 kg)   Height: 5' 9\" (1.753 m)       Physical Exam  Constitutional:       Appearance: He is well-developed. HENT:      Head: Normocephalic and atraumatic. Mouth/Throat:      Mouth: Mucous membranes are moist.   Eyes:      Conjunctiva/sclera: Conjunctivae normal.      Pupils: Pupils are equal, round, and reactive to light. Neck:      Musculoskeletal: Normal range of motion. Cardiovascular:      Rate and Rhythm: Normal rate and regular rhythm. Pulmonary:      Effort: Pulmonary effort is normal. No respiratory distress. Breath sounds: No wheezing or rales.

## 2020-08-26 ASSESSMENT — ENCOUNTER SYMPTOMS
TROUBLE SWALLOWING: 0
PHOTOPHOBIA: 0
SHORTNESS OF BREATH: 0
CHOKING: 0
BLURRED VISION: 0
NAUSEA: 0
VOMITING: 0
VOICE CHANGE: 0

## 2020-09-01 ENCOUNTER — TELEPHONE (OUTPATIENT)
Dept: FAMILY MEDICINE CLINIC | Age: 60
End: 2020-09-01

## 2020-09-01 NOTE — TELEPHONE ENCOUNTER
Patient states he has had a colonoscopy previously, told he is not at big risk, declines another colonoscopy/cologuard at this time.

## 2021-03-01 ENCOUNTER — OFFICE VISIT (OUTPATIENT)
Dept: PRIMARY CARE CLINIC | Age: 61
End: 2021-03-01
Payer: COMMERCIAL

## 2021-03-01 VITALS
HEIGHT: 69 IN | HEART RATE: 86 BPM | RESPIRATION RATE: 14 BRPM | BODY MASS INDEX: 26.66 KG/M2 | DIASTOLIC BLOOD PRESSURE: 80 MMHG | WEIGHT: 180 LBS | TEMPERATURE: 97.1 F | OXYGEN SATURATION: 98 % | SYSTOLIC BLOOD PRESSURE: 128 MMHG

## 2021-03-01 DIAGNOSIS — I10 ESSENTIAL HYPERTENSION: ICD-10-CM

## 2021-03-01 DIAGNOSIS — E11.69 DM TYPE 2 WITH DIABETIC DYSLIPIDEMIA (HCC): ICD-10-CM

## 2021-03-01 DIAGNOSIS — E55.9 VITAMIN D DEFICIENCY: ICD-10-CM

## 2021-03-01 DIAGNOSIS — E78.00 PURE HYPERCHOLESTEROLEMIA: ICD-10-CM

## 2021-03-01 DIAGNOSIS — E78.5 DM TYPE 2 WITH DIABETIC DYSLIPIDEMIA (HCC): ICD-10-CM

## 2021-03-01 PROCEDURE — 1036F TOBACCO NON-USER: CPT | Performed by: INTERNAL MEDICINE

## 2021-03-01 PROCEDURE — 3046F HEMOGLOBIN A1C LEVEL >9.0%: CPT | Performed by: INTERNAL MEDICINE

## 2021-03-01 PROCEDURE — 99214 OFFICE O/P EST MOD 30 MIN: CPT | Performed by: INTERNAL MEDICINE

## 2021-03-01 PROCEDURE — 3017F COLORECTAL CA SCREEN DOC REV: CPT | Performed by: INTERNAL MEDICINE

## 2021-03-01 PROCEDURE — G8419 CALC BMI OUT NRM PARAM NOF/U: HCPCS | Performed by: INTERNAL MEDICINE

## 2021-03-01 PROCEDURE — G8484 FLU IMMUNIZE NO ADMIN: HCPCS | Performed by: INTERNAL MEDICINE

## 2021-03-01 PROCEDURE — 2022F DILAT RTA XM EVC RTNOPTHY: CPT | Performed by: INTERNAL MEDICINE

## 2021-03-01 PROCEDURE — G8427 DOCREV CUR MEDS BY ELIG CLIN: HCPCS | Performed by: INTERNAL MEDICINE

## 2021-03-01 RX ORDER — LOSARTAN POTASSIUM 25 MG/1
TABLET ORAL
Qty: 90 TABLET | Refills: 3 | Status: SHIPPED | OUTPATIENT
Start: 2021-03-01 | End: 2022-03-21

## 2021-03-01 RX ORDER — METFORMIN HYDROCHLORIDE 500 MG/1
500 TABLET, EXTENDED RELEASE ORAL 4 TIMES DAILY
Qty: 360 TABLET | Refills: 3 | Status: SHIPPED
Start: 2021-03-01 | End: 2021-03-13 | Stop reason: SDUPTHER

## 2021-03-01 RX ORDER — ATORVASTATIN CALCIUM 10 MG/1
10 TABLET, FILM COATED ORAL DAILY
Qty: 90 TABLET | Refills: 3 | Status: SHIPPED | OUTPATIENT
Start: 2021-03-01 | End: 2022-03-21

## 2021-03-01 RX ORDER — CHOLECALCIFEROL (VITAMIN D3) 50 MCG
2000 TABLET ORAL DAILY
Qty: 90 TABLET | Refills: 3 | Status: SHIPPED | OUTPATIENT
Start: 2021-03-01

## 2021-03-01 ASSESSMENT — PATIENT HEALTH QUESTIONNAIRE - PHQ9
SUM OF ALL RESPONSES TO PHQ QUESTIONS 1-9: 0
SUM OF ALL RESPONSES TO PHQ QUESTIONS 1-9: 0

## 2021-03-01 NOTE — PROGRESS NOTES
Jorge Luis Cantrell 62 y.o. male presents today with   Chief Complaint   Patient presents with    Check-Up       Diabetes  He presents for his follow-up diabetic visit. He has type 2 diabetes mellitus. His disease course has been fluctuating. Pertinent negatives for hypoglycemia include no confusion, dizziness or tremors. Pertinent negatives for diabetes include no blurred vision, no chest pain and no fatigue. Symptoms are stable. Hyperlipidemia  This is a chronic problem. The current episode started more than 1 year ago. The problem is controlled. Recent lipid tests were reviewed and are normal. He has no history of diabetes. Pertinent negatives include no chest pain or shortness of breath. Hypertension  This is a chronic problem. The current episode started more than 1 year ago. The problem is unchanged. The problem is controlled. Associated symptoms include anxiety. Pertinent negatives include no blurred vision, chest pain, palpitations or shortness of breath.     glucose up to 250    Past Medical History:   Diagnosis Date    DM (diabetes mellitus screen)      Patient Active Problem List    Diagnosis Date Noted    Vitamin D deficiency 04/24/2018    Osteoarthritis 11/14/2017    Erectile dysfunction 03/21/2017    DM type 2 with diabetic dyslipidemia ([facility] [address] 03/17/2017    Hyperlipidemia 03/17/2017     Past Surgical History:   Procedure Laterality Date    APPENDECTOMY       Family History   Problem Relation Age of Onset    Stroke Father     Arthritis Sister     Diabetes Sister     Diabetes Brother      Social History     Socioeconomic History    Marital status:      Spouse name: None    Number of children: None    Years of education: None    Highest education level: None   Occupational History    None   Social Needs    Financial resource strain: Not hard at all   Social [employer] insecurity     Worry: Never true     Inability: Never true   [employer] needs     Medical: None     Non-medical: None Tobacco Use    Smoking status: Never Smoker    Smokeless tobacco: Never Used   Substance and Sexual Activity    Alcohol use: Yes     Alcohol/week: 6.0 standard drinks     Types: 6 Cans of beer per week    Drug use: No    Sexual activity: None   Lifestyle    Physical activity     Days per week: None     Minutes per session: None    Stress: None   Relationships    Social connections     Talks on phone: None     Gets together: None     Attends Orthodoxy service: None     Active member of club or organization: None     Attends meetings of clubs or organizations: None     Relationship status: None    Intimate partner violence     Fear of current or ex partner: None     Emotionally abused: None     Physically abused: None     Forced sexual activity: None   Other Topics Concern    None   Social History Narrative    None     Allergies   Allergen Reactions    Actos [Pioglitazone]      Dizziness         Review of Systems   Constitutional: Negative for fatigue and fever. HENT: Negative for trouble swallowing and voice change. Eyes: Negative for blurred vision, photophobia and visual disturbance. Respiratory: Negative for choking and shortness of breath. Cardiovascular: Negative for chest pain and palpitations. Gastrointestinal: Negative for nausea. Genitourinary: Negative for decreased urine volume, testicular pain and urgency. Skin: Negative for rash. Neurological: Negative for dizziness, tremors and syncope. Hematological: Does not bruise/bleed easily. Psychiatric/Behavioral: Negative for confusion and suicidal ideas. Vitals:    03/01/21 1649   BP: 128/80   Pulse: 86   Resp: 14   Temp: 97.1 °F (36.2 °C)   SpO2: 98%   Weight: 180 lb (81.6 kg)   Height: 5' 9\" (1.753 m)       Physical Exam  Constitutional:       Appearance: He is well-developed. HENT:      Head: Normocephalic and atraumatic.    Eyes:      Conjunctiva/sclera: Conjunctivae normal.      Pupils: Pupils are equal, round, and reactive to light. Neck:      Musculoskeletal: Normal range of motion. Cardiovascular:      Rate and Rhythm: Normal rate and regular rhythm. Pulmonary:      Effort: Pulmonary effort is normal. No respiratory distress. Abdominal:      General: There is no distension. Musculoskeletal: Normal range of motion. Skin:     General: Skin is dry. Coloration: Skin is not jaundiced. Neurological:      Mental Status: He is alert. Psychiatric:         Mood and Affect: Mood normal.        tradjenta 5 mg  [id number] #12 box  Assessment/Plan  Dian was seen today for check-up. Diagnoses and all orders for this visit:    DM type 2 with diabetic dyslipidemia ([facility] [address]  -     metFORMIN (GLUCOPHAGE-XR) 500 MG extended release tablet; Take 1 tablet by mouth 4 times daily Divided in a single dose  -     losartan (COZAAR) 25 MG tablet; TAKE 1 TABLET BY MOUTH Candice Murphy MD, Endocrinology, [place]  -     linagliptin (TRADJENTA) 5 MG tablet; Take 1 tablet by mouth daily    Pure hypercholesterolemia  -     atorvastatin (LIPITOR) 10 MG tablet; Take 1 tablet by mouth daily    Essential hypertension  -     losartan (COZAAR) 25 MG tablet; TAKE 1 TABLET BY MOUTH EVERY DAY    Vitamin D deficiency  -     vitamin D (CHOLECALCIFEROL) 50 MCG (2000 UT) TABS tablet; Take 1 tablet by mouth daily        Return in about 6 months (around 9/1/2021), or if symptoms worsen or fail to improve.     Isha Steel MD

## 2021-03-06 ASSESSMENT — ENCOUNTER SYMPTOMS
PHOTOPHOBIA: 0
BLURRED VISION: 0
TROUBLE SWALLOWING: 0
SHORTNESS OF BREATH: 0
VOICE CHANGE: 0
CHOKING: 0
NAUSEA: 0

## 2021-03-13 ENCOUNTER — OFFICE VISIT (OUTPATIENT)
Dept: ENDOCRINOLOGY | Age: 61
End: 2021-03-13
Payer: COMMERCIAL

## 2021-03-13 VITALS — OXYGEN SATURATION: 96 % | HEIGHT: 69 IN | WEIGHT: 180 LBS | BODY MASS INDEX: 26.66 KG/M2 | HEART RATE: 88 BPM

## 2021-03-13 DIAGNOSIS — E78.5 DM TYPE 2 WITH DIABETIC DYSLIPIDEMIA (HCC): Primary | ICD-10-CM

## 2021-03-13 DIAGNOSIS — E11.69 DM TYPE 2 WITH DIABETIC DYSLIPIDEMIA (HCC): Primary | ICD-10-CM

## 2021-03-13 LAB
CHP ED QC CHECK: NORMAL
GLUCOSE BLD-MCNC: 229 MG/DL

## 2021-03-13 PROCEDURE — G8419 CALC BMI OUT NRM PARAM NOF/U: HCPCS | Performed by: INTERNAL MEDICINE

## 2021-03-13 PROCEDURE — 99213 OFFICE O/P EST LOW 20 MIN: CPT | Performed by: INTERNAL MEDICINE

## 2021-03-13 PROCEDURE — G8484 FLU IMMUNIZE NO ADMIN: HCPCS | Performed by: INTERNAL MEDICINE

## 2021-03-13 PROCEDURE — 3046F HEMOGLOBIN A1C LEVEL >9.0%: CPT | Performed by: INTERNAL MEDICINE

## 2021-03-13 PROCEDURE — G8427 DOCREV CUR MEDS BY ELIG CLIN: HCPCS | Performed by: INTERNAL MEDICINE

## 2021-03-13 PROCEDURE — 3017F COLORECTAL CA SCREEN DOC REV: CPT | Performed by: INTERNAL MEDICINE

## 2021-03-13 PROCEDURE — 82962 GLUCOSE BLOOD TEST: CPT | Performed by: INTERNAL MEDICINE

## 2021-03-13 PROCEDURE — 2022F DILAT RTA XM EVC RTNOPTHY: CPT | Performed by: INTERNAL MEDICINE

## 2021-03-13 PROCEDURE — 1036F TOBACCO NON-USER: CPT | Performed by: INTERNAL MEDICINE

## 2021-03-13 RX ORDER — INSULIN GLARGINE 100 [IU]/ML
INJECTION, SOLUTION SUBCUTANEOUS
Qty: 5 PEN | Refills: 3 | Status: SHIPPED | OUTPATIENT
Start: 2021-03-13 | End: 2021-10-23 | Stop reason: ALTCHOICE

## 2021-03-13 RX ORDER — METFORMIN HYDROCHLORIDE 500 MG/1
TABLET, EXTENDED RELEASE ORAL
Qty: 360 TABLET | Refills: 3 | Status: SHIPPED | OUTPATIENT
Start: 2021-03-13 | End: 2022-03-26 | Stop reason: SDUPTHER

## 2021-03-13 NOTE — PROGRESS NOTES
Subjective:      Patient ID: Raffy Barreto is a 62 y.o. male. Patient referred here for uncontrolled diabetes has had diabetes for close to 15 years currently on Metformin and glimepiride also taken Tradjenta blood sugars are still high overall fasting sugars are higher closer to 100 range patient does watch his diet BMI is normal  Diabetes  He presents for his initial diabetic visit. He has type 2 diabetes mellitus. His disease course has been worsening. There are no hypoglycemic associated symptoms. Pertinent negatives for diabetes include no polydipsia, no polyuria, no visual change and no weight loss. There are no hypoglycemic complications. Diabetic complications include impotence. Current diabetic treatment includes oral agent (triple therapy). His overall blood glucose range is >200 mg/dl. (Lab Results       Component                Value               Date                       LABA1C                   8.8 (H)             07/30/2020            Higher fasting glucose  ) An ACE inhibitor/angiotensin II receptor blocker is being taken. Results for Brijesh Lindsay (MRN [id number]) as of 3/13/2021 10:53   Ref. Range 4/21/2018 09:17 10/18/2018 08:58 1/23/2019 13:29 2/4/2020 14:36 7/30/2020 09:08   Hemoglobin A1C Latest Ref Range: 4.8 - 5.9 % 8.4 (H) 7.4 (H) 6.1 9.4 (H) 8.8 (H)       Results for Brijesh Lindsay (MRN [id number]) as of 3/13/2021 10:53   Ref.  Range 1/23/2019 15:17 2/4/2020 09:21 2/4/2020 14:36 7/30/2020 09:08 3/13/2021 10:49   Sodium Latest Ref Range: 135 - 144 mEq/L  142  139    Potassium Latest Ref Range: 3.4 - 4.9 mEq/L  4.0  4.1    Chloride Latest Ref Range: 95 - 107 mEq/L  99  101    CO2 Latest Ref Range: 20 - 31 mEq/L  25  22    BUN Latest Ref Range: 6 - 20 mg/dL  10  11    Creatinine Latest Ref Range: 0.70 - 1.20 mg/dL  0.66 (L)  0.57 (L)    Anion Gap Latest Ref Range: 9 - 15 mEq/L  18 (H)  16 (H)    GFR Non- Latest Ref Range: >60   >60.0  >60.0    GFR  Latest Ref Range: >60   >60.0  >60.0    Glucose Latest Units: mg/dL  215 (H)  179 (H) 229   Calcium Latest Ref Range: 8.5 - 9.9 mg/dL  9.5  9.3    Total Protein Latest Ref Range: 6.3 - 8.0 g/dL  7.8  7.3    Cholesterol, Total Latest Ref Range: 0 - 199 mg/dL  109      HDL Cholesterol Latest Ref Range: 40 - 59 mg/dL  49      LDL Calculated Latest Ref Range: 0 - 129 mg/dL  33      Triglycerides Latest Ref Range: 0 - 150 mg/dL  133      Albumin Latest Ref Range: 3.5 - 4.6 g/dL  4.9 (H)  4.6    Globulin Latest Ref Range: 2.3 - 3.5 g/dL  2.9  2.7    Alk Phos Latest Ref Range: 35 - 104 U/L  91  74    ALT Latest Ref Range: 0 - 41 U/L  47 (H)  22    AST Latest Ref Range: 0 - 40 U/L  41 (H)  26    Bilirubin Latest Ref Range: 0.2 - 0.7 mg/dL  0.9 (H)  1.4 (H)    Hemoglobin A1C Latest Ref Range: 4.8 - 5.9 %   9.4 (H) 8.8 (H)    Sex Hormone Binding Latest Ref Range: 11 - 80 nmol/L  25      Testosterone Free, Calc Latest Ref Range: 47 - 244 pg/mL  68      Total Testosterone Latest Ref Range: 300 - 890 ng/dL  326      Testosterone % Free Latest Ref Range: 1.6 - 2.9 %  2.1      Vit D, 25-Hydroxy Latest Ref Range: 30.0 - 100.0 ng/mL  39.8      WBC Latest Ref Range: 4.8 - 10.8 K/uL  6.4      RBC Latest Ref Range: 4.70 - 6.10 M/uL  5.01      Hemoglobin Quant Latest Ref Range: 14.0 - 18.0 g/dL  15.7      Hematocrit Latest Ref Range: 42.0 - 52.0 %  45.6      MCV Latest Ref Range: 80.0 - 100.0 fL  91.1      MCH Latest Ref Range: 27.0 - 31.3 pg  31.4 (H)      MCHC Latest Ref Range: 33.0 - 37.0 %  34.4      RDW Latest Ref Range: 11.5 - 14.5 %  12.9      Platelet Count Latest Ref Range: 130 - 400 K/uL  285      Neutrophils % Latest Units: %  46.5      Lymphocyte % Latest Units: %  40.8      Monocytes % Latest Units: %  8.2      Eosinophils % Latest Units: %  3.6      Basophils % Latest Units: %  0.9      Neutrophils Absolute Latest Ref Range: 1.4 - 6.5 K/uL  3.0      Lymphocytes Absolute Latest Ref Range: 1.0 - 4.8 K/uL  2.6      Monocytes Absolute Latest Ref Range: 0.2 - 0.8 K/uL  0.5      Eosinophils Absolute Latest Ref Range: 0.0 - 0.7 K/uL  0.2      Basophils Absolute Latest Ref Range: 0.0 - 0.2 K/uL  0.1      Prostatic Specific Ag Latest Ref Range: 0.00 - 5.40 ng/mL    2.58    Creatinine, Ur Latest Ref Range: Not Established mg/dL 43.3       Microalbumin Creatinine Ratio Latest Ref Range: 0.0 - 30.0 mg/G see below       Microalbumin, Random Urine Latest Ref Range: Not Established mg/dL <1.20         Patient Active Problem List   Diagnosis    DM type 2 with diabetic dyslipidemia ([facility] [address]    Hyperlipidemia    Erectile dysfunction    Osteoarthritis    Vitamin D deficiency     Social History     Socioeconomic History    Marital status:      Spouse name: Not on file    Number of children: Not on file    Years of education: Not on file    Highest education level: Not on file   Occupational History    Not on file   Social Needs    Financial resource strain: Not hard at all   [email or web address] insecurity     Worry: Never true     Inability: Never true   [employer] needs     Medical: Not on file     Non-medical: Not on file   Tobacco Use    Smoking status: Never Smoker    Smokeless tobacco: Never Used   Substance and Sexual Activity    Alcohol use:  Yes     Alcohol/week: 6.0 standard drinks     Types: 6 Cans of beer per week    Drug use: No    Sexual activity: Not on file   Lifestyle    Physical activity     Days per week: Not on file     Minutes per session: Not on file    Stress: Not on file   Relationships    Social connections     Talks on phone: Not on file     Gets together: Not on file     Attends Muslim service: Not on file     Active member of club or organization: Not on file     Attends meetings of clubs or organizations: Not on file     Relationship status: Not on file    Intimate partner violence     Fear of current or ex partner: Not on file     Emotionally abused: Not on file     Physically abused: Not on file Forced sexual activity: Not on file   Other Topics Concern    Not on file   Social History Narrative    Not on file     Past Surgical History:   Procedure Laterality Date    APPENDECTOMY       Family History   Problem Relation Age of Onset    Stroke Father     Arthritis Sister     Diabetes Sister     Diabetes Brother      Allergies   Allergen Reactions    Actos [Pioglitazone]      Dizziness         Current Outpatient Medications:     atorvastatin (LIPITOR) 10 MG tablet, Take 1 tablet by mouth daily, Disp: 90 tablet, Rfl: 3    losartan (COZAAR) 25 MG tablet, TAKE 1 TABLET BY MOUTH EVERY DAY, Disp: 90 tablet, Rfl: 3    vitamin D (CHOLECALCIFEROL) 50 MCG (2000 UT) TABS tablet, Take 1 tablet by mouth daily, Disp: 90 tablet, Rfl: 3    linagliptin (TRADJENTA) 5 MG tablet, Take 1 tablet by mouth daily, Disp: 30 tablet, Rfl: 5    sildenafil (VIAGRA) 100 MG tablet, Take 1 tablet by mouth as needed for Erectile Dysfunction, Disp: 10 tablet, Rfl: 11    glyBURIDE (DIABETA) 5 MG tablet, Take 2 tablets by mouth 2 times daily (with meals), Disp: 360 tablet, Rfl: 3    glyBURIDE (DIABETA) 2.5 MG tablet, TAKE 1 TABLET BY MOUTH TWICE DAILY (WITH MEALS), Disp: 180 tablet, Rfl: 0    metFORMIN (GLUCOPHAGE XR) 500 MG extended release tablet, Take 1 tablet by mouth daily (with breakfast), Disp: 360 tablet, Rfl: 3      Review of Systems   Constitutional: Negative for weight loss. Endocrine: Negative for polydipsia and polyuria. Genitourinary: Positive for impotence. Vitals:    03/13/21 1047   Pulse: 88   SpO2: 96%   Weight: 180 lb (81.6 kg)   Height: 5' 9\" (1.753 m)       Objective:   Physical Exam  Constitutional:       Appearance: Normal appearance. He is normal weight. HENT:      Head: Normocephalic and atraumatic. Right Ear: External ear normal.      Left Ear: External ear normal.      Nose: Nose normal.      Mouth/Throat:      Pharynx: Oropharynx is clear.    Eyes:      Extraocular Movements: Extraocular movements intact. Conjunctiva/sclera: Conjunctivae normal.   Neck:      Musculoskeletal: Normal range of motion and neck supple. Cardiovascular:      Rate and Rhythm: Normal rate and regular rhythm. Heart sounds: Normal heart sounds. Pulmonary:      Breath sounds: Normal breath sounds. No wheezing or rhonchi. Abdominal:      Palpations: Abdomen is soft. Musculoskeletal: Normal range of motion. Feet:    Skin:     General: Skin is warm and dry. Neurological:      General: No focal deficit present. Mental Status: He is alert and oriented to person, place, and time. Psychiatric:         Mood and Affect: Mood normal.         Behavior: Behavior normal.         Assessment:       Diagnosis Orders   1.  DM type 2 with diabetic dyslipidemia (HCC)  POCT Glucose           Plan:      Orders Placed This Encounter   Procedures    Basic Metabolic Panel     Standing Status:   Future     Standing Expiration Date:   3/13/2022    Hemoglobin A1C     Standing Status:   Future     Standing Expiration Date:   3/13/2022    POCT Glucose     Start patient on Lantus 20 units at bedtime continue Metformin 1 in the morning and lunch to dinner plus glyburide  A1c goal of 7 or lower  Patient educated about how to take insulin injections  More than 50% of time spent in patient with patient counseling thank you for the referral  Orders Placed This Encounter   Medications    insulin glargine (LANTUS SOLOSTAR) 100 UNIT/ML injection pen     Si units at  bedtime     Dispense:  5 pen     Refill:  3    Insulin Pen Needle (NOVOFINE) 32G X 6 MM MISC     Sig: Once ad ay     Dispense:  100 each     Refill:  3    metFORMIN (GLUCOPHAGE XR) 500 MG extended release tablet     Si po am and lunch and 2 dinner     Dispense:  360 tablet     Refill:  3     Discontinue Tradjenta  Medications Discontinued During This Encounter   Medication Reason    metFORMIN (GLUCOPHAGE-XR) 500 MG extended release tablet DUPLICATE    linagliptin (TRADJENTA) 5 MG tablet Therapy completed    metFORMIN (GLUCOPHAGE XR) 500 MG extended release tablet Enrique Guzman MD

## 2021-03-14 DIAGNOSIS — E11.69 DM TYPE 2 WITH DIABETIC DYSLIPIDEMIA (HCC): ICD-10-CM

## 2021-03-14 DIAGNOSIS — E78.5 DM TYPE 2 WITH DIABETIC DYSLIPIDEMIA (HCC): ICD-10-CM

## 2021-03-15 RX ORDER — GLYBURIDE 5 MG/1
10 TABLET ORAL 2 TIMES DAILY WITH MEALS
Qty: 360 TABLET | Refills: 0 | Status: SHIPPED | OUTPATIENT
Start: 2021-03-15 | End: 2021-06-15

## 2021-03-15 ASSESSMENT — ENCOUNTER SYMPTOMS: VISUAL CHANGE: 0

## 2021-03-15 NOTE — TELEPHONE ENCOUNTER
Pharmacy requesting medication refill.  Please approve or deny this request.    Rx requested:  Requested Prescriptions     Pending Prescriptions Disp Refills    glyBURIDE (DIABETA) 5 MG tablet [Pharmacy Med Name: glyBURIDE Oral Tablet 5 MG] 360 tablet 0     Sig: TAKE 2 TABLETS BY MOUTH 2 TIMES DAILY (WITH MEALS)         Last Office Visit:   3/1/2021      Next Visit Date:  Future Appointments   Date Time Provider Sally Hagan   4/10/2021 10:45 AM MD Em Obrien   5/24/2021  4:30 PM Wili Cantu [facility]

## 2021-06-14 DIAGNOSIS — E11.69 DM TYPE 2 WITH DIABETIC DYSLIPIDEMIA (HCC): ICD-10-CM

## 2021-06-14 DIAGNOSIS — E78.5 DM TYPE 2 WITH DIABETIC DYSLIPIDEMIA (HCC): ICD-10-CM

## 2021-06-15 RX ORDER — GLYBURIDE 5 MG/1
TABLET ORAL
Qty: 360 TABLET | Refills: 0 | Status: SHIPPED | OUTPATIENT
Start: 2021-06-15 | End: 2021-09-13

## 2021-06-15 NOTE — TELEPHONE ENCOUNTER
Pharmacy requesting medication refill. Please approve or deny this request.    Rx requested:  Requested Prescriptions     Pending Prescriptions Disp Refills    glyBURIDE (DIABETA) 5 MG tablet [Pharmacy Med Name: glyBURIDE Oral Tablet 5 MG] 360 tablet 0     Sig: TAKE TWO TABLETS BY MOUTH TWICE DAILY WITH MEALS         Last Office Visit:   3/1/2021      Next Visit Date:  No future appointments.

## 2021-08-06 ENCOUNTER — TELEPHONE (OUTPATIENT)
Dept: NEUROLOGY | Age: 61
End: 2021-08-06

## 2021-10-04 DIAGNOSIS — E78.5 DM TYPE 2 WITH DIABETIC DYSLIPIDEMIA (HCC): ICD-10-CM

## 2021-10-04 DIAGNOSIS — E11.69 DM TYPE 2 WITH DIABETIC DYSLIPIDEMIA (HCC): ICD-10-CM

## 2021-10-04 RX ORDER — GLYBURIDE 5 MG/1
TABLET ORAL
Qty: 360 TABLET | Refills: 0 | Status: SHIPPED | OUTPATIENT
Start: 2021-10-04 | End: 2021-10-23 | Stop reason: ALTCHOICE

## 2021-10-22 ENCOUNTER — TELEPHONE (OUTPATIENT)
Dept: PRIMARY CARE CLINIC | Age: 61
End: 2021-10-22

## 2021-10-22 NOTE — TELEPHONE ENCOUNTER
----- Message from Zuly Body sent at 10/22/2021 12:01 PM EDT -----  Subject: Appointment Request    Reason for Call: Routine (Patient Request) No Script    QUESTIONS  Type of Appointment? Established Patient  Reason for appointment request? No appointments available during search  Additional Information for Provider? pt would like to schedule his   diabetic follow up with Dr. Sinan Dominguez. Pt screened green   ---------------------------------------------------------------------------  --------------  CALL BACK INFO  What is the best way for the office to contact you? OK to leave message on   voicemail  Preferred Call Back Phone Number? [phone]  ---------------------------------------------------------------------------  --------------  SCRIPT ANSWERS  Relationship to Patient? Self  (Is the patient requesting to see the provider for a procedure?)? No  (Is the patient requesting to see the provider urgently  today or   tomorrow. )? No  Have you been diagnosed with, awaiting test results for, or told that you   are suspected of having COVID-19 (Coronavirus)? (If patient has tested   negative or was tested as a requirement for work, school, or travel and   not based on symptoms, answer no)? No  Within the past two weeks have you developed any of the following symptoms   (answer no if symptoms have been present longer than 2 weeks or began   more than 2 weeks ago)? Fever or Chills, Cough, Shortness of breath or   difficulty breathing, Loss of taste or smell, Sore throat, Nasal   congestion, Sneezing or runny nose, Fatigue or generalized body aches   (answer no if pain is specific to a body part e.g. back pain), Diarrhea,   Headache? No  Have you had close contact with someone with COVID-19 in the last 14 days? No  (Service Expert  click yes below to proceed with [facility] As Usual   Scheduling)?  Yes

## 2021-10-22 NOTE — TELEPHONE ENCOUNTER
Called and gave Dr Rosalee Norman number to the patient, so that he can contact them and schedule his appt.

## 2021-10-23 ENCOUNTER — OFFICE VISIT (OUTPATIENT)
Dept: ENDOCRINOLOGY | Age: 61
End: 2021-10-23
Payer: COMMERCIAL

## 2021-10-23 VITALS
HEIGHT: 69 IN | WEIGHT: 182 LBS | HEART RATE: 91 BPM | BODY MASS INDEX: 26.96 KG/M2 | DIASTOLIC BLOOD PRESSURE: 88 MMHG | SYSTOLIC BLOOD PRESSURE: 138 MMHG | OXYGEN SATURATION: 97 %

## 2021-10-23 DIAGNOSIS — E11.69 DM TYPE 2 WITH DIABETIC DYSLIPIDEMIA (HCC): Primary | ICD-10-CM

## 2021-10-23 DIAGNOSIS — E78.5 DM TYPE 2 WITH DIABETIC DYSLIPIDEMIA (HCC): Primary | ICD-10-CM

## 2021-10-23 DIAGNOSIS — Z23 NEED FOR INFLUENZA VACCINATION: Primary | ICD-10-CM

## 2021-10-23 LAB
CHP ED QC CHECK: NORMAL
GLUCOSE BLD-MCNC: 279 MG/DL

## 2021-10-23 PROCEDURE — 1036F TOBACCO NON-USER: CPT | Performed by: INTERNAL MEDICINE

## 2021-10-23 PROCEDURE — 90471 IMMUNIZATION ADMIN: CPT | Performed by: INTERNAL MEDICINE

## 2021-10-23 PROCEDURE — G8419 CALC BMI OUT NRM PARAM NOF/U: HCPCS | Performed by: INTERNAL MEDICINE

## 2021-10-23 PROCEDURE — G8482 FLU IMMUNIZE ORDER/ADMIN: HCPCS | Performed by: INTERNAL MEDICINE

## 2021-10-23 PROCEDURE — G8427 DOCREV CUR MEDS BY ELIG CLIN: HCPCS | Performed by: INTERNAL MEDICINE

## 2021-10-23 PROCEDURE — 90674 CCIIV4 VAC NO PRSV 0.5 ML IM: CPT | Performed by: INTERNAL MEDICINE

## 2021-10-23 PROCEDURE — 99213 OFFICE O/P EST LOW 20 MIN: CPT | Performed by: INTERNAL MEDICINE

## 2021-10-23 PROCEDURE — 82962 GLUCOSE BLOOD TEST: CPT | Performed by: INTERNAL MEDICINE

## 2021-10-23 PROCEDURE — 3052F HG A1C>EQUAL 8.0%<EQUAL 9.0%: CPT | Performed by: INTERNAL MEDICINE

## 2021-10-23 PROCEDURE — 2022F DILAT RTA XM EVC RTNOPTHY: CPT | Performed by: INTERNAL MEDICINE

## 2021-10-23 PROCEDURE — 3017F COLORECTAL CA SCREEN DOC REV: CPT | Performed by: INTERNAL MEDICINE

## 2021-10-23 RX ORDER — INSULIN LISPRO 100 [IU]/ML
INJECTION, SUSPENSION SUBCUTANEOUS
Qty: 10 PEN | Refills: 3 | Status: SHIPPED | OUTPATIENT
Start: 2021-10-23 | End: 2021-10-23 | Stop reason: SDUPTHER

## 2021-10-23 RX ORDER — FLASH GLUCOSE SCANNING READER
EACH MISCELLANEOUS
Qty: 1 EACH | Refills: 0 | Status: SHIPPED | OUTPATIENT
Start: 2021-10-23 | End: 2022-10-31

## 2021-10-23 RX ORDER — FLASH GLUCOSE SENSOR
KIT MISCELLANEOUS
Qty: 2 EACH | Refills: 3 | Status: SHIPPED | OUTPATIENT
Start: 2021-10-23 | End: 2022-10-31

## 2021-10-23 RX ORDER — INSULIN LISPRO 100 [IU]/ML
INJECTION, SUSPENSION SUBCUTANEOUS
Qty: 10 PEN | Refills: 3 | Status: SHIPPED | OUTPATIENT
Start: 2021-10-23 | End: 2022-03-26 | Stop reason: SDUPTHER

## 2021-10-23 RX ORDER — FLASH GLUCOSE SENSOR
KIT MISCELLANEOUS
Qty: 2 EACH | Refills: 3 | Status: SHIPPED | OUTPATIENT
Start: 2021-10-23 | End: 2021-10-23 | Stop reason: SDUPTHER

## 2021-10-23 RX ORDER — FLASH GLUCOSE SCANNING READER
EACH MISCELLANEOUS
Qty: 1 EACH | Refills: 0 | Status: SHIPPED | OUTPATIENT
Start: 2021-10-23 | End: 2021-10-23 | Stop reason: SDUPTHER

## 2021-10-23 NOTE — PROGRESS NOTES
Dispense:  100 each     Refill:  3           Orders Placed This Encounter   Procedures    POCT Glucose       Subjective:     Chief Complaint   Patient presents with    Diabetes    6 Month Follow-Up     Vitals:    10/23/21 1034   BP: 138/88   Pulse: 91   SpO2: 97%   Weight: 182 lb (82.6 kg)   Height: 5' 9\" (1.753 m)     Wt Readings from Last 3 Encounters:   10/23/21 182 lb (82.6 kg)   03/13/21 180 lb (81.6 kg)   03/01/21 180 lb (81.6 kg)     BP Readings from Last 3 Encounters:   10/23/21 138/88   03/01/21 128/80   08/24/20 120/81     Follow-up on type 2 diabetes patient testing blood sugar intermittently currently on Lantus Metformin glyburide blood sugars are higher postprandial  Hemoglobin A1c was 8.5    Diabetes  He presents for his follow-up diabetic visit. He has type 2 diabetes mellitus. His disease course has been fluctuating. Symptoms are worsening. Current diabetic treatment includes insulin injections. He is currently taking insulin at bedtime. His overall blood glucose range is >200 mg/dl. (Lab Results       Component                Value               Date                       LABA1C                   8.5 (H)             10/08/2021              ) An ACE inhibitor/angiotensin II receptor blocker is being taken. Past Medical History:   Diagnosis Date    DM (diabetes mellitus screen)      Past Surgical History:   Procedure Laterality Date    APPENDECTOMY       Social History     Socioeconomic History    Marital status:      Spouse name: Not on file    Number of children: Not on file    Years of education: Not on file    Highest education level: Not on file   Occupational History    Not on file   Tobacco Use    Smoking status: Never Smoker    Smokeless tobacco: Never Used   Substance and Sexual Activity    Alcohol use:  Yes     Alcohol/week: 6.0 standard drinks     Types: 6 Cans of beer per week    Drug use: No    Sexual activity: Not on file   Other Topics Concern    Not on file   Social History Narrative    Not on file     Social Determinants of Health     Financial Resource Strain:     Difficulty of Paying Living Expenses:    Food Insecurity:     Worried About Running Out of Food in the Last Year:     [address] in the Last Year:    Transportation Needs:     Lack of Transportation (Medical):      Lack of Transportation (Non-Medical):    Physical Activity:     Days of Exercise per Week:     Minutes of Exercise per Session:    Stress:     Feeling of Stress :    Social Connections:     Frequency of Communication with Friends and Family:     Frequency of Social Gatherings with Friends and Family:     Attends Ten[employer] Services:     Active Member of Clubs or Organizations:     Attends Club or Organization Meetings:     Marital Status:    Intimate Partner Violence:     Fear of Current or Ex-Partner:     Emotionally Abused:     Physically Abused:     Sexually Abused:      Family History   Problem Relation Age of Onset    Stroke Father     Arthritis Sister     Diabetes Sister     Diabetes Brother      Allergies   Allergen Reactions    Actos [Pioglitazone]      Dizziness         Current Outpatient Medications:     glyBURIDE (DIABETA) 5 MG tablet, TAKE TWO TABLETS BY MOUTH TWICE DAILY WITH MEALS , Disp: 360 tablet, Rfl: 0    insulin glargine (LANTUS SOLOSTAR) 100 UNIT/ML injection pen, 20 units at  bedtime, Disp: 5 pen, Rfl: 3    Insulin Pen Needle (NOVOFINE) 32G X 6 MM MISC, Once ad ay, Disp: 100 each, Rfl: 3    metFORMIN (GLUCOPHAGE XR) 500 MG extended release tablet, 1 po am and lunch and 2 dinner, Disp: 360 tablet, Rfl: 3    atorvastatin (LIPITOR) 10 MG tablet, Take 1 tablet by mouth daily, Disp: 90 tablet, Rfl: 3    losartan (COZAAR) 25 MG tablet, TAKE 1 TABLET BY MOUTH EVERY DAY, Disp: 90 tablet, Rfl: 3    vitamin D (CHOLECALCIFEROL) 50 MCG (2000 UT) TABS tablet, Take 1 tablet by mouth daily, Disp: 90 tablet, Rfl: 3  Lab Results   Component Value Date  10/08/2021    K 4.6 10/08/2021     10/08/2021    CO2 24 10/08/2021    BUN 8 10/08/2021    CREATININE 0.64 (L) 10/08/2021    GLUCOSE 279 10/23/2021    CALCIUM 9.5 10/08/2021    PROT 7.3 07/30/2020    LABALBU 4.6 07/30/2020    BILITOT 1.4 (H) 07/30/2020    ALKPHOS 74 07/30/2020    AST 26 07/30/2020    ALT 22 07/30/2020    LABGLOM >60.0 10/08/2021    GFRAA >60.0 10/08/2021    GLOB 2.7 07/30/2020     Lab Results   Component Value Date    WBC 6.4 02/04/2020    HGB 15.7 02/04/2020    HCT 45.6 02/04/2020    MCV 91.1 02/04/2020     02/04/2020     Lab Results   Component Value Date    LABA1C 8.5 (H) 10/08/2021    LABA1C 8.8 (H) 07/30/2020    LABA1C 9.4 (H) 02/04/2020     Lab Results   Component Value Date    HDL 49 02/04/2020    HDL 56 10/18/2018    HDL 48 04/21/2018    LDLCALC 33 02/04/2020    LDLCALC 37 10/18/2018    LDLCALC 42 04/21/2018    CHOL 109 02/04/2020    CHOL 117 10/18/2018    CHOL 121 04/21/2018    TRIG 133 02/04/2020    TRIG 120 10/18/2018    TRIG 157 04/21/2018     Lab Results   Component Value Date    TESTM 326 02/04/2020       Review of Systems   Cardiovascular: Negative. Endocrine: Negative. All other systems reviewed and are negative. Objective:   Physical Exam  Vitals reviewed. Constitutional:       Appearance: Normal appearance. He is normal weight. HENT:      Head: Normocephalic and atraumatic. Hair is normal.      Right Ear: External ear normal.      Left Ear: External ear normal.      Nose: Nose normal.   Eyes:      General: No scleral icterus. Right eye: No discharge. Left eye: No discharge. Extraocular Movements: Extraocular movements intact. Conjunctiva/sclera: Conjunctivae normal.   Neck:      Trachea: Trachea normal.   Cardiovascular:      Rate and Rhythm: Normal rate. Pulmonary:      Effort: Pulmonary effort is normal.   Musculoskeletal:         General: Normal range of motion.       Cervical back: Normal range of motion and neck supple. Neurological:      General: No focal deficit present. Mental Status: He is alert and oriented to person, place, and time.    Psychiatric:         Mood and Affect: Mood normal.         Behavior: Behavior normal.

## 2021-10-27 RX ORDER — INSULIN LISPRO 100 [IU]/ML
INJECTION, SUSPENSION SUBCUTANEOUS
Qty: 10 PEN | Refills: 3 | Status: SHIPPED
Start: 2021-10-27 | End: 2022-03-26 | Stop reason: SDUPTHER

## 2021-12-19 ENCOUNTER — OFFICE VISIT (OUTPATIENT)
Dept: FAMILY MEDICINE CLINIC | Age: 61
End: 2021-12-19
Payer: COMMERCIAL

## 2021-12-19 VITALS
DIASTOLIC BLOOD PRESSURE: 80 MMHG | WEIGHT: 185 LBS | BODY MASS INDEX: 27.4 KG/M2 | TEMPERATURE: 95.5 F | OXYGEN SATURATION: 99 % | HEIGHT: 69 IN | SYSTOLIC BLOOD PRESSURE: 122 MMHG | HEART RATE: 72 BPM

## 2021-12-19 DIAGNOSIS — U07.1 COVID-19: Primary | ICD-10-CM

## 2021-12-19 LAB
Lab: NORMAL
PERFORMING INSTRUMENT: NORMAL
QC PASS/FAIL: NORMAL
SARS-COV-2, POC: NORMAL

## 2021-12-19 PROCEDURE — 99213 OFFICE O/P EST LOW 20 MIN: CPT | Performed by: PHYSICIAN ASSISTANT

## 2021-12-19 PROCEDURE — 87426 SARSCOV CORONAVIRUS AG IA: CPT | Performed by: PHYSICIAN ASSISTANT

## 2021-12-19 PROCEDURE — 1036F TOBACCO NON-USER: CPT | Performed by: PHYSICIAN ASSISTANT

## 2021-12-19 PROCEDURE — G8427 DOCREV CUR MEDS BY ELIG CLIN: HCPCS | Performed by: PHYSICIAN ASSISTANT

## 2021-12-19 PROCEDURE — G8482 FLU IMMUNIZE ORDER/ADMIN: HCPCS | Performed by: PHYSICIAN ASSISTANT

## 2021-12-19 PROCEDURE — G8419 CALC BMI OUT NRM PARAM NOF/U: HCPCS | Performed by: PHYSICIAN ASSISTANT

## 2021-12-19 PROCEDURE — 3017F COLORECTAL CA SCREEN DOC REV: CPT | Performed by: PHYSICIAN ASSISTANT

## 2021-12-19 SDOH — ECONOMIC STABILITY: FOOD INSECURITY: WITHIN THE PAST 12 MONTHS, YOU WORRIED THAT YOUR FOOD WOULD RUN OUT BEFORE YOU GOT MONEY TO BUY MORE.: NEVER TRUE

## 2021-12-19 SDOH — ECONOMIC STABILITY: FOOD INSECURITY: WITHIN THE PAST 12 MONTHS, THE FOOD YOU BOUGHT JUST DIDN'T LAST AND YOU DIDN'T HAVE MONEY TO GET MORE.: NEVER TRUE

## 2021-12-19 ASSESSMENT — ENCOUNTER SYMPTOMS
CHEST TIGHTNESS: 0
VOMITING: 0
SINUS PRESSURE: 0
SORE THROAT: 0
BACK PAIN: 0
ABDOMINAL PAIN: 0
SHORTNESS OF BREATH: 0
DIARRHEA: 0
SINUS PAIN: 0
NAUSEA: 0
COUGH: 0

## 2021-12-19 ASSESSMENT — SOCIAL DETERMINANTS OF HEALTH (SDOH): HOW HARD IS IT FOR YOU TO PAY FOR THE VERY BASICS LIKE FOOD, HOUSING, MEDICAL CARE, AND HEATING?: NOT HARD AT ALL

## 2021-12-19 ASSESSMENT — VISUAL ACUITY: OU: 1

## 2021-12-19 NOTE — PROGRESS NOTES
[address] Encounter  CHIEF COMPLAINT       Chief Complaint   Patient presents with    Concern For COVID-19     no symptoms, refused PCR test       HISTORY OF PRESENT ILLNESS   Jasper Read is a 62 y.o. male who presents with:  HPI   The patient is presenting today with CC of COVID-19 Exposure x1 week. Patient found out yesterday of exposure. No symptoms. No fevers, chills, nausea, or vomiting. REVIEW OF SYSTEMS     Review of Systems   Constitutional: Negative for activity change, appetite change, chills and fever. HENT: Negative for congestion, drooling, sinus pressure, sinus pain and sore throat. Eyes: Negative for visual disturbance. Respiratory: Negative for cough, chest tightness and shortness of breath. Cardiovascular: Negative for chest pain. Gastrointestinal: Negative for abdominal pain, diarrhea, nausea and vomiting. Endocrine: Negative for cold intolerance. Genitourinary: Negative for dysuria, flank pain, frequency and hematuria. Musculoskeletal: Negative for arthralgias and back pain. Skin: Negative for rash. Allergic/Immunologic: Negative for food allergies. Neurological: Negative for weakness, light-headedness, numbness and headaches. Hematological: Does not bruise/bleed easily. PAST MEDICAL HISTORY         Diagnosis Date    DM (diabetes mellitus screen)      SURGICAL HISTORY     Patient  has a past surgical history that includes Appendectomy.   CURRENT MEDICATIONS       Previous Medications    ATORVASTATIN (LIPITOR) 10 MG TABLET    Take 1 tablet by mouth daily    CONTINUOUS BLOOD GLUC  (FREESTYLE TELMA 14 DAY READER) FLAQUITO    As directed    CONTINUOUS BLOOD GLUC SENSOR (FREESTYLE TELMA 14 DAY SENSOR) MISC    Every 2 weeks    HUMALOG MIX 75/25 KWIKPEN (75-25) 100 UNIT/ML SUPN INJECTION PEN    14 units am 20 units dinner    INSULIN LISPRO PROTAMINE & LISPRO (HUMALOG MIX 75/25 KWIKPEN) (75-25) 100 UNIT PER ML SUPN INJECTION PEN    14 units am and 20 units dinner    INSULIN PEN NEEDLE (NOVOFINE) 32G X 6 MM MISC    Once ad ay    INSULIN PEN NEEDLE 32G X 4 MM MISC    1 each by Does not apply route 2 times daily    LOSARTAN (COZAAR) 25 MG TABLET    TAKE 1 TABLET BY MOUTH EVERY DAY    METFORMIN (GLUCOPHAGE XR) 500 MG EXTENDED RELEASE TABLET    1 po am and lunch and 2 dinner    VITAMIN D (CHOLECALCIFEROL) 50 MCG (2000 UT) TABS TABLET    Take 1 tablet by mouth daily     ALLERGIES     Patient is is allergic to actos [pioglitazone]. FAMILY HISTORY     Patient'sfamily history includes Arthritis in his sister; Diabetes in his brother and sister; Stroke in his father. SOCIAL HISTORY     Patient  reports that he has never smoked. He has never used smokeless tobacco. He reports current alcohol use of about 6.0 standard drinks of alcohol per week. He reports that he does not use drugs. PHYSICAL EXAM     VITALS  BP: 122/80, Temp: 95.5 °F (35.3 °C), Pulse: 72,  , SpO2: 99 %  Physical Exam  Vitals and nursing note reviewed. Constitutional:       General: He is awake. He is not in acute distress. Appearance: Normal appearance. He is well-developed. He is not ill-appearing, toxic-appearing or diaphoretic. HENT:      Head: Normocephalic and atraumatic. Right Ear: Hearing and external ear normal.      Left Ear: Hearing and external ear normal.      Nose: Nose normal.   Eyes:      General: Lids are normal. Vision grossly intact. Gaze aligned appropriately. Conjunctiva/sclera: Conjunctivae normal.   Cardiovascular:      Rate and Rhythm: Normal rate and regular rhythm. Pulses: Normal pulses. Heart sounds: Normal heart sounds, S1 normal and S2 normal.   Pulmonary:      Effort: Pulmonary effort is normal.      Breath sounds: Normal breath sounds and air entry. Musculoskeletal:      Cervical back: Normal range of motion. Skin:     General: Skin is warm. Capillary Refill: Capillary refill takes less than 2 seconds. Neurological:      Mental Status: He is alert and oriented to person, place, and time. Gait: Gait is intact. Psychiatric:         Attention and Perception: Attention normal.         Mood and Affect: Mood normal.         Speech: Speech normal.         Behavior: Behavior normal. Behavior is cooperative. READY CARE COURSE   Labs:  No results found for this visit on 12/19/21. IMAGING:  No orders to display     Scheduled Meds:  Continuous Infusions:  PRN Meds:. PROCEDURES:  FINAL IMPRESSION      1. COVID-19      DISPOSITION/PLAN   1. Negative COVID-19. Retest if develop symptoms. On this date 12/19/2021 I have spent 20 minutes reviewing previous notes, test results and face to face with the patient discussing the diagnosis and importance of compliance with the treatment plan as well as documenting on the day of the visit. PATIENT REFERRED TO:  No follow-ups on file. DISCHARGE MEDICATIONS:  New Prescriptions    No medications on file     Cannot display discharge medications since this is not an admission.        Rebecca Woodward

## 2022-02-18 NOTE — TELEPHONE ENCOUNTER
Increased dose of 75/25 to 30 units at dinnertime and have patient follow-up in 2 to 3 weeks with BMP and A1c of blood sugar logs

## 2022-02-18 NOTE — TELEPHONE ENCOUNTER
Patient started on Humalog Mix 75/25 about a week ago and Glyburide was discontinued. Patient states that his sugars are still running high in the morning but seem to be good during the day. He states that his blood sugar this AM was 285. Please advise.

## 2022-03-16 DIAGNOSIS — E11.69 DM TYPE 2 WITH DIABETIC DYSLIPIDEMIA (HCC): ICD-10-CM

## 2022-03-16 DIAGNOSIS — E78.5 DM TYPE 2 WITH DIABETIC DYSLIPIDEMIA (HCC): ICD-10-CM

## 2022-03-16 LAB
ANION GAP SERPL CALCULATED.3IONS-SCNC: 11 MEQ/L (ref 9–15)
BUN BLDV-MCNC: 9 MG/DL (ref 8–23)
CALCIUM SERPL-MCNC: 9.7 MG/DL (ref 8.5–9.9)
CHLORIDE BLD-SCNC: 102 MEQ/L (ref 95–107)
CO2: 27 MEQ/L (ref 20–31)
CREAT SERPL-MCNC: 0.61 MG/DL (ref 0.7–1.2)
CREATININE URINE: 197.2 MG/DL
GFR AFRICAN AMERICAN: >60
GFR NON-AFRICAN AMERICAN: >60
GLUCOSE BLD-MCNC: 167 MG/DL (ref 70–99)
HBA1C MFR BLD: 9.7 % (ref 4.8–5.9)
MICROALBUMIN UR-MCNC: 2.3 MG/DL
MICROALBUMIN/CREAT UR-RTO: 11.7 MG/G (ref 0–30)
POTASSIUM SERPL-SCNC: 4.3 MEQ/L (ref 3.4–4.9)
SODIUM BLD-SCNC: 140 MEQ/L (ref 135–144)

## 2022-03-18 DIAGNOSIS — E78.5 DM TYPE 2 WITH DIABETIC DYSLIPIDEMIA (HCC): ICD-10-CM

## 2022-03-18 DIAGNOSIS — I10 ESSENTIAL HYPERTENSION: ICD-10-CM

## 2022-03-18 DIAGNOSIS — E78.00 PURE HYPERCHOLESTEROLEMIA: ICD-10-CM

## 2022-03-18 DIAGNOSIS — E11.69 DM TYPE 2 WITH DIABETIC DYSLIPIDEMIA (HCC): ICD-10-CM

## 2022-03-18 PROCEDURE — 3046F HEMOGLOBIN A1C LEVEL >9.0%: CPT | Performed by: INTERNAL MEDICINE

## 2022-03-21 RX ORDER — ATORVASTATIN CALCIUM 10 MG/1
10 TABLET, FILM COATED ORAL DAILY
Qty: 90 TABLET | Refills: 0 | Status: SHIPPED | OUTPATIENT
Start: 2022-03-21 | End: 2022-03-26 | Stop reason: SDUPTHER

## 2022-03-21 RX ORDER — LOSARTAN POTASSIUM 25 MG/1
TABLET ORAL
Qty: 90 TABLET | Refills: 0 | Status: SHIPPED | OUTPATIENT
Start: 2022-03-21 | End: 2022-03-26 | Stop reason: SDUPTHER

## 2022-03-26 ENCOUNTER — OFFICE VISIT (OUTPATIENT)
Dept: ENDOCRINOLOGY | Age: 62
End: 2022-03-26
Payer: COMMERCIAL

## 2022-03-26 VITALS
OXYGEN SATURATION: 98 % | HEART RATE: 86 BPM | WEIGHT: 184 LBS | HEIGHT: 69 IN | BODY MASS INDEX: 27.25 KG/M2 | SYSTOLIC BLOOD PRESSURE: 102 MMHG | DIASTOLIC BLOOD PRESSURE: 64 MMHG

## 2022-03-26 DIAGNOSIS — I10 ESSENTIAL HYPERTENSION: ICD-10-CM

## 2022-03-26 DIAGNOSIS — E78.00 PURE HYPERCHOLESTEROLEMIA: ICD-10-CM

## 2022-03-26 DIAGNOSIS — E78.5 DM TYPE 2 WITH DIABETIC DYSLIPIDEMIA (HCC): Primary | ICD-10-CM

## 2022-03-26 DIAGNOSIS — E11.69 DM TYPE 2 WITH DIABETIC DYSLIPIDEMIA (HCC): Primary | ICD-10-CM

## 2022-03-26 LAB
CHP ED QC CHECK: NORMAL
GLUCOSE BLD-MCNC: 283 MG/DL

## 2022-03-26 PROCEDURE — 2022F DILAT RTA XM EVC RTNOPTHY: CPT | Performed by: INTERNAL MEDICINE

## 2022-03-26 PROCEDURE — G8427 DOCREV CUR MEDS BY ELIG CLIN: HCPCS | Performed by: INTERNAL MEDICINE

## 2022-03-26 PROCEDURE — G8419 CALC BMI OUT NRM PARAM NOF/U: HCPCS | Performed by: INTERNAL MEDICINE

## 2022-03-26 PROCEDURE — 1036F TOBACCO NON-USER: CPT | Performed by: INTERNAL MEDICINE

## 2022-03-26 PROCEDURE — 99214 OFFICE O/P EST MOD 30 MIN: CPT | Performed by: INTERNAL MEDICINE

## 2022-03-26 PROCEDURE — 3046F HEMOGLOBIN A1C LEVEL >9.0%: CPT | Performed by: INTERNAL MEDICINE

## 2022-03-26 PROCEDURE — 3017F COLORECTAL CA SCREEN DOC REV: CPT | Performed by: INTERNAL MEDICINE

## 2022-03-26 PROCEDURE — G8482 FLU IMMUNIZE ORDER/ADMIN: HCPCS | Performed by: INTERNAL MEDICINE

## 2022-03-26 PROCEDURE — 82962 GLUCOSE BLOOD TEST: CPT | Performed by: INTERNAL MEDICINE

## 2022-03-26 RX ORDER — METFORMIN HYDROCHLORIDE 500 MG/1
TABLET, EXTENDED RELEASE ORAL
Qty: 360 TABLET | Refills: 3 | Status: SHIPPED | OUTPATIENT
Start: 2022-03-26 | End: 2022-03-26 | Stop reason: SDUPTHER

## 2022-03-26 RX ORDER — INSULIN LISPRO 100 [IU]/ML
INJECTION, SUSPENSION SUBCUTANEOUS
Qty: 10 PEN | Refills: 3
Start: 2022-03-26 | End: 2022-10-10

## 2022-03-26 RX ORDER — ATORVASTATIN CALCIUM 10 MG/1
10 TABLET, FILM COATED ORAL DAILY
Qty: 90 TABLET | Refills: 0 | Status: SHIPPED | OUTPATIENT
Start: 2022-03-26 | End: 2022-09-23 | Stop reason: SDUPTHER

## 2022-03-26 RX ORDER — METFORMIN HYDROCHLORIDE 500 MG/1
TABLET, EXTENDED RELEASE ORAL
Qty: 360 TABLET | Refills: 3 | Status: SHIPPED | OUTPATIENT
Start: 2022-03-26

## 2022-03-26 RX ORDER — INSULIN LISPRO 100 [IU]/ML
INJECTION, SUSPENSION SUBCUTANEOUS
Qty: 10 PEN | Refills: 3
Start: 2022-03-26 | End: 2022-03-26 | Stop reason: SDUPTHER

## 2022-03-26 RX ORDER — LOSARTAN POTASSIUM 25 MG/1
TABLET ORAL
Qty: 90 TABLET | Refills: 0 | Status: SHIPPED | OUTPATIENT
Start: 2022-03-26 | End: 2022-09-23 | Stop reason: SDUPTHER

## 2022-03-26 ASSESSMENT — ENCOUNTER SYMPTOMS: EYES NEGATIVE: 1

## 2022-03-26 NOTE — PROGRESS NOTES
3/26/2022    Assessment:       Diagnosis Orders   1. DM type 2 with diabetic dyslipidemia (HCC)  POCT Glucose   2. Essential hypertension     3. Pure hypercholesterolemia           PLAN:     Orders Placed This Encounter   Procedures    Hemoglobin A1C     Standing Status:   Future     Standing Expiration Date:   3/26/2023    Basic Metabolic Panel     Standing Status:   Future     Standing Expiration Date:   3/26/2023    POCT Glucose     Increased dose of insulin (3 times a day 15 units in the morning 10 at lunch 25 at dinnertime  Discussed extensively regarding getting insulin pump for better control  A1c goal of 6.5  More than 50% of 30-minute spent patient education counseling  Diabetes education provided today:    Insulin pumps, how they work and how they affect blood sugar levels. Continuous Glucose monitor. How it works and checks blood sugars every 5 min. for 4 days during our tests. Managing high and low sugar readings.     Orders Placed This Encounter   Medications    DISCONTD: metFORMIN (GLUCOPHAGE XR) 500 MG extended release tablet     Si po am and lunch and 2 dinner     Dispense:  360 tablet     Refill:  3    losartan (COZAAR) 25 MG tablet     Sig: Once day     Dispense:  90 tablet     Refill:  0    atorvastatin (LIPITOR) 10 MG tablet     Sig: Take 1 tablet by mouth daily     Dispense:  90 tablet     Refill:  0    DISCONTD: insulin lispro protamine & lispro (HUMALOG MIX 75/25 KWIKPEN) (75-25) 100 UNIT per ML SUPN injection pen     Si units am 10 units lunch  and 24 units dinner     Dispense:  10 pen     Refill:  3    insulin lispro protamine & lispro (HUMALOG MIX 75/25 KWIKPEN) (75-25) 100 UNIT per ML SUPN injection pen     Sig: 15 units am 10 units lunch  and 25 units dinner     Dispense:  10 pen     Refill:  3    metFORMIN (GLUCOPHAGE XR) 500 MG extended release tablet     Si po am and lunch and 2 dinner     Dispense:  360 tablet     Refill:  3         Orders Placed This Encounter Smoking status: Never Smoker    Smokeless tobacco: Never Used   Substance and Sexual Activity    Alcohol use: Yes     Alcohol/week: 6.0 standard drinks     Types: 6 Cans of beer per week    Drug use: No    Sexual activity: Not on file   Other Topics Concern    Not on file   Social History Narrative    Not on file     Social Determinants of Health     Financial Resource Strain: Low Risk     Difficulty of Paying Living Expenses: Not hard at all   Food Insecurity: No Food Insecurity    Worried About [facility] in the Last Year: Never true    [facility] in the Last Year: Never true   Transportation Needs:     Lack of Transportation (Medical): Not on file    Lack of Transportation (Non-Medical):  Not on file   Physical Activity:     Days of Exercise per Week: Not on file    Minutes of Exercise per Session: Not on file   Stress:     Feeling of Stress : Not on file   Social Connections:     Frequency of Communication with Friends and Family: Not on file    Frequency of Social Gatherings with Friends and Family: Not on file    Attends Yarsani Services: Not on file    Active Member of [address] [place] [address] or Organizations: Not on file    Attends Club or Organization Meetings: Not on file    Marital Status: Not on file   Intimate Partner Violence:     Fear of Current or Ex-Partner: Not on file    Emotionally Abused: Not on file    Physically Abused: Not on file    Sexually Abused: Not on file   Housing Stability:     Unable to Pay for Housing in the Last Year: Not on file    Number of [place] in the Last Year: Not on file    Unstable Housing in the Last Year: Not on file     Family History   Problem Relation Age of Onset    Stroke Father     Arthritis Sister     Diabetes Sister     Diabetes Brother      Allergies   Allergen Reactions    Actos [Pioglitazone]      Dizziness         Current Outpatient Medications:     losartan (COZAAR) 25 MG tablet, TAKE 1 TABLET BY Lisa Agarwal, Disp: 90 tablet, Rfl: 0    atorvastatin (LIPITOR) 10 MG tablet, TAKE 1 TABLET BY MOUTH DAILY, Disp: 90 tablet, Rfl: 0    insulin lispro protamine & lispro (HUMALOG MIX 75/25 KWIKPEN) (75-25) 100 UNIT per ML SUPN injection pen, 14 units am and 20 units dinner, Disp: 10 pen, Rfl: 3    Insulin Pen Needle 32G X 4 MM MISC, 1 each by Does not apply route 2 times daily, Disp: 100 each, Rfl: 3    Insulin Pen Needle (NOVOFINE) 32G X 6 MM MISC, Once ad ay, Disp: 100 each, Rfl: 3    metFORMIN (GLUCOPHAGE XR) 500 MG extended release tablet, 1 po am and lunch and 2 dinner, Disp: 360 tablet, Rfl: 3    vitamin D (CHOLECALCIFEROL) 50 MCG (2000 UT) TABS tablet, Take 1 tablet by mouth daily, Disp: 90 tablet, Rfl: 3    Continuous Blood Gluc Sensor (FREESTYLE TELMA 14 DAY SENSOR) MISC, Every 2 weeks (Patient not taking: Reported on 3/26/2022), Disp: 2 each, Rfl: 3    Continuous Blood Gluc  (FREESTYLE TELMA 14 DAY READER) FLAQUITO, As directed (Patient not taking: Reported on 3/26/2022), Disp: 1 each, Rfl: 00  Lab Results   Component Value Date     03/16/2022    K 4.3 03/16/2022     03/16/2022    CO2 27 03/16/2022    BUN 9 03/16/2022    CREATININE 0.61 (L) 03/16/2022    GLUCOSE 283 03/26/2022    CALCIUM 9.7 03/16/2022    PROT 7.3 07/30/2020    LABALBU 4.6 07/30/2020    BILITOT 1.4 (H) 07/30/2020    ALKPHOS 74 07/30/2020    AST 26 07/30/2020    ALT 22 07/30/2020    LABGLOM >60.0 03/16/2022    GFRAA >60.0 03/16/2022    GLOB 2.7 07/30/2020     Lab Results   Component Value Date    WBC 6.4 02/04/2020    HGB 15.7 02/04/2020    HCT 45.6 02/04/2020    MCV 91.1 02/04/2020     02/04/2020     Lab Results   Component Value Date    LABA1C 9.7 (H) 03/16/2022    LABA1C 8.5 (H) 10/08/2021    LABA1C 8.8 (H) 07/30/2020     Lab Results   Component Value Date    HDL 49 02/04/2020    HDL 56 10/18/2018    HDL 48 04/21/2018    LDLCALC 33 02/04/2020    LDLCALC 37 10/18/2018    LDLCALC 42 04/21/2018    CHOL 109 02/04/2020    CHOL 117 10/18/2018    CHOL 121 04/21/2018    TRIG 133 02/04/2020    TRIG 120 10/18/2018    TRIG 157 04/21/2018     Lab Results   Component Value Date    TESTM 326 02/04/2020     No results found for: TSH, TSHREFLEX, FT3, T4FREE  No results found for: TPOABS    Review of Systems   Constitutional: Positive for fatigue. Eyes: Negative. Cardiovascular: Negative. Endocrine: Negative. All other systems reviewed and are negative. Objective:   Physical Exam  Vitals reviewed. Constitutional:       General: He is not in acute distress. Appearance: Normal appearance. He is obese. HENT:      Head: Normocephalic and atraumatic. Right Ear: External ear normal.      Left Ear: External ear normal.      Nose: Nose normal.   Eyes:      General: No scleral icterus. Right eye: No discharge. Left eye: No discharge. Extraocular Movements: Extraocular movements intact. Conjunctiva/sclera: Conjunctivae normal.   Cardiovascular:      Rate and Rhythm: Normal rate. Pulmonary:      Effort: Pulmonary effort is normal.   Musculoskeletal:         General: Normal range of motion. Cervical back: Normal range of motion and neck supple. Neurological:      General: No focal deficit present. Mental Status: He is alert and oriented to person, place, and time.    Psychiatric:         Mood and Affect: Mood normal.         Behavior: Behavior normal.

## 2022-04-29 ENCOUNTER — TELEPHONE (OUTPATIENT)
Dept: PRIMARY CARE CLINIC | Age: 62
End: 2022-04-29

## 2022-04-29 NOTE — TELEPHONE ENCOUNTER
Call placed to patient, doesn't need to make an appt right now. He is presently staying in touch with Dr. Pavithra Arndt he says.

## 2022-05-20 ENCOUNTER — TELEPHONE (OUTPATIENT)
Dept: GASTROENTEROLOGY | Age: 62
End: 2022-05-20

## 2022-05-20 NOTE — TELEPHONE ENCOUNTER
Spoke to patient regarding Colon Screening . Patient stated that he will check with his insurance and call me back.

## 2022-06-15 ENCOUNTER — COMMUNITY OUTREACH (OUTPATIENT)
Dept: PRIMARY CARE CLINIC | Age: 62
End: 2022-06-15

## 2022-07-11 ENCOUNTER — TELEPHONE (OUTPATIENT)
Dept: GASTROENTEROLOGY | Age: 62
End: 2022-07-11

## 2022-08-25 ENCOUNTER — TELEPHONE (OUTPATIENT)
Dept: PRIMARY CARE CLINIC | Age: 62
End: 2022-08-25

## 2022-09-14 ENCOUNTER — NURSE ONLY (OUTPATIENT)
Dept: PRIMARY CARE CLINIC | Age: 62
End: 2022-09-14
Payer: COMMERCIAL

## 2022-09-14 DIAGNOSIS — E78.5 DM TYPE 2 WITH DIABETIC DYSLIPIDEMIA (HCC): Primary | ICD-10-CM

## 2022-09-14 DIAGNOSIS — E11.69 DM TYPE 2 WITH DIABETIC DYSLIPIDEMIA (HCC): Primary | ICD-10-CM

## 2022-09-14 LAB — HBA1C MFR BLD: 7.7 %

## 2022-09-14 PROCEDURE — 83036 HEMOGLOBIN GLYCOSYLATED A1C: CPT | Performed by: INTERNAL MEDICINE

## 2022-09-14 ASSESSMENT — PATIENT HEALTH QUESTIONNAIRE - PHQ9
1. LITTLE INTEREST OR PLEASURE IN DOING THINGS: 0
SUM OF ALL RESPONSES TO PHQ QUESTIONS 1-9: 0
SUM OF ALL RESPONSES TO PHQ9 QUESTIONS 1 & 2: 0
SUM OF ALL RESPONSES TO PHQ QUESTIONS 1-9: 0
SUM OF ALL RESPONSES TO PHQ QUESTIONS 1-9: 0
2. FEELING DOWN, DEPRESSED OR HOPELESS: 0
SUM OF ALL RESPONSES TO PHQ QUESTIONS 1-9: 0

## 2022-09-23 ENCOUNTER — OFFICE VISIT (OUTPATIENT)
Dept: PRIMARY CARE CLINIC | Age: 62
End: 2022-09-23
Payer: COMMERCIAL

## 2022-09-23 VITALS
SYSTOLIC BLOOD PRESSURE: 138 MMHG | WEIGHT: 186 LBS | TEMPERATURE: 98 F | BODY MASS INDEX: 27.55 KG/M2 | HEIGHT: 69 IN | HEART RATE: 74 BPM | OXYGEN SATURATION: 100 % | DIASTOLIC BLOOD PRESSURE: 72 MMHG

## 2022-09-23 DIAGNOSIS — E03.9 HYPOTHYROIDISM, UNSPECIFIED TYPE: ICD-10-CM

## 2022-09-23 DIAGNOSIS — E78.5 DM TYPE 2 WITH DIABETIC DYSLIPIDEMIA (HCC): ICD-10-CM

## 2022-09-23 DIAGNOSIS — Z11.4 ENCOUNTER FOR SCREENING FOR HIV: ICD-10-CM

## 2022-09-23 DIAGNOSIS — E11.69 DM TYPE 2 WITH DIABETIC DYSLIPIDEMIA (HCC): ICD-10-CM

## 2022-09-23 DIAGNOSIS — Z12.11 COLON CANCER SCREENING: ICD-10-CM

## 2022-09-23 DIAGNOSIS — I10 ESSENTIAL HYPERTENSION: ICD-10-CM

## 2022-09-23 DIAGNOSIS — E78.00 PURE HYPERCHOLESTEROLEMIA: ICD-10-CM

## 2022-09-23 DIAGNOSIS — Z12.5 PROSTATE CANCER SCREENING: ICD-10-CM

## 2022-09-23 DIAGNOSIS — Z11.59 ENCOUNTER FOR HEPATITIS C SCREENING TEST FOR LOW RISK PATIENT: ICD-10-CM

## 2022-09-23 DIAGNOSIS — Z00.00 PREVENTATIVE HEALTH CARE: Primary | ICD-10-CM

## 2022-09-23 PROCEDURE — 99396 PREV VISIT EST AGE 40-64: CPT | Performed by: INTERNAL MEDICINE

## 2022-09-23 RX ORDER — LOSARTAN POTASSIUM 25 MG/1
TABLET ORAL
Qty: 90 TABLET | Refills: 3 | Status: SHIPPED | OUTPATIENT
Start: 2022-09-23

## 2022-09-23 RX ORDER — ATORVASTATIN CALCIUM 10 MG/1
10 TABLET, FILM COATED ORAL DAILY
Qty: 90 TABLET | Refills: 3 | Status: SHIPPED | OUTPATIENT
Start: 2022-09-23

## 2022-09-23 ASSESSMENT — PATIENT HEALTH QUESTIONNAIRE - PHQ9
SUM OF ALL RESPONSES TO PHQ QUESTIONS 1-9: 0
SUM OF ALL RESPONSES TO PHQ QUESTIONS 1-9: 0
SUM OF ALL RESPONSES TO PHQ9 QUESTIONS 1 & 2: 0
SUM OF ALL RESPONSES TO PHQ QUESTIONS 1-9: 0
2. FEELING DOWN, DEPRESSED OR HOPELESS: 0
SUM OF ALL RESPONSES TO PHQ QUESTIONS 1-9: 0
1. LITTLE INTEREST OR PLEASURE IN DOING THINGS: 0

## 2022-09-23 NOTE — PROGRESS NOTES
Cristóbal Raymond 65 y.o. male presents today with   Chief Complaint   Patient presents with    Annual Exam    Diabetes    Medication Refill     annual    Diabetes  He presents for his follow-up diabetic visit. He has type 2 diabetes mellitus. His disease course has been stable. Pertinent negatives for hypoglycemia include no confusion, dizziness, headaches or tremors. Pertinent negatives for diabetes include no blurred vision, no chest pain and no fatigue. Symptoms are stable. Hypertension  This is a chronic problem. The current episode started more than 1 year ago. The problem is unchanged. The problem is controlled. Associated symptoms include anxiety. Pertinent negatives include no blurred vision, chest pain, headaches, palpitations or shortness of breath. There are no associated agents to hypertension. Past Medical History:   Diagnosis Date    DM (diabetes mellitus screen)      Patient Active Problem List    Diagnosis Date Noted    Vitamin D deficiency 04/24/2018    Osteoarthritis 11/14/2017    Erectile dysfunction 03/21/2017    DM type 2 with diabetic dyslipidemia ([facility] [address] 03/17/2017    Hyperlipidemia 03/17/2017     Past Surgical History:   Procedure Laterality Date    APPENDECTOMY       Family History   Problem Relation Age of Onset    Stroke Father     Arthritis Sister     Diabetes Sister     Diabetes Brother      Social History     Socioeconomic History    Marital status:      Spouse name: None    Number of children: None    Years of education: None    Highest education level: None   Tobacco Use    Smoking status: Never    Smokeless tobacco: Never   Substance and Sexual Activity    Alcohol use:  Yes     Alcohol/week: 6.0 standard drinks     Types: 6 Cans of beer per week    Drug use: No     Social Determinants of Health     Financial Resource Strain: Low Risk     Difficulty of Paying Living Expenses: Not hard at all   Food Insecurity: No Food Insecurity    Worried About [facility] in the Last Year: Never true    Ran Out of Food in the Last Year: Never true     Allergies   Allergen Reactions    Actos [Pioglitazone]      Dizziness         Review of Systems   Constitutional:  Negative for fatigue and fever. HENT:  Negative for trouble swallowing and voice change. Eyes:  Negative for blurred vision, photophobia and visual disturbance. Respiratory:  Negative for choking and shortness of breath. Cardiovascular:  Negative for chest pain and palpitations. Gastrointestinal:  Negative for nausea and vomiting. Genitourinary:  Negative for decreased urine volume, testicular pain and urgency. Skin:  Negative for rash. Neurological:  Negative for dizziness, tremors, syncope and headaches. Hematological:  Does not bruise/bleed easily. Psychiatric/Behavioral:  Negative for confusion and suicidal ideas. Vitals:    09/23/22 1010 09/23/22 1036   BP: (!) 142/80 138/72   Site: Right Upper Arm Left Upper Arm   Position: Sitting Sitting   Cuff Size: Large Adult    Pulse: 74    Temp: 98 °F (36.7 °C)    SpO2: 100%    Weight: 186 lb (84.4 kg)    Height: 5' 9\" (1.753 m)        Physical Exam  Constitutional:       Appearance: He is well-developed. HENT:      Head: Normocephalic. Eyes:      Conjunctiva/sclera: Conjunctivae normal.   Cardiovascular:      Rate and Rhythm: Regular rhythm. Pulmonary:      Effort: Pulmonary effort is normal.   Abdominal:      General: There is no distension. Musculoskeletal:         General: Normal range of motion. Cervical back: Normal range of motion. Skin:     Coloration: Skin is not jaundiced. Neurological:      Mental Status: He is alert. Assessment/Plan  Dian was seen today for annual exam, diabetes and medication refill. Diagnoses and all orders for this visit:    Preventative health care  -     Lipid, Fasting; Future  -     CBC with Auto Differential; Future  -     Comprehensive Metabolic Panel; Future  -     HIV Screen;  Future  - Hepatitis C Antibody; Future    DM type 2 with diabetic dyslipidemia ([facility] [address]  -     Comprehensive Metabolic Panel; Future  -     losartan (COZAAR) 25 MG tablet; Once day    Essential hypertension  -     TSH with Reflex; Future  -     CBC with Auto Differential; Future  -     Comprehensive Metabolic Panel; Future  -     losartan (COZAAR) 25 MG tablet; Once day    Pure hypercholesterolemia  -     Lipid, Fasting; Future  -     atorvastatin (LIPITOR) 10 MG tablet; Take 1 tablet by mouth daily    Hypothyroidism, unspecified type  -     TSH with Reflex; Future    Encounter for screening for HIV  -     HIV Screen; Future    Encounter for hepatitis C screening test for low risk patient  -     Hepatitis C Antibody; Future    Colon cancer screening  -     Fecal DNA Colorectal cancer screening (Cologuard)    Prostate cancer screening  -     PSA Screening; Future      Return in about 6 months (around 3/23/2023), or if symptoms worsen or fail to improve.     Robert Guerra MD

## 2022-09-29 ASSESSMENT — ENCOUNTER SYMPTOMS
VOMITING: 0
BLURRED VISION: 0
NAUSEA: 0
SHORTNESS OF BREATH: 0
CHOKING: 0
PHOTOPHOBIA: 0
VOICE CHANGE: 0
TROUBLE SWALLOWING: 0

## 2022-10-10 RX ORDER — INSULIN LISPRO 100 [IU]/ML
INJECTION, SUSPENSION SUBCUTANEOUS
Qty: 30 ML | Refills: 5 | Status: SHIPPED | OUTPATIENT
Start: 2022-10-10

## 2022-10-31 ENCOUNTER — OFFICE VISIT (OUTPATIENT)
Dept: PRIMARY CARE CLINIC | Age: 62
End: 2022-10-31
Payer: COMMERCIAL

## 2022-10-31 VITALS
HEIGHT: 69 IN | HEART RATE: 80 BPM | SYSTOLIC BLOOD PRESSURE: 128 MMHG | WEIGHT: 188 LBS | TEMPERATURE: 98.1 F | DIASTOLIC BLOOD PRESSURE: 82 MMHG | OXYGEN SATURATION: 98 % | BODY MASS INDEX: 27.85 KG/M2

## 2022-10-31 DIAGNOSIS — H61.22 HEARING LOSS OF LEFT EAR DUE TO CERUMEN IMPACTION: Primary | ICD-10-CM

## 2022-10-31 DIAGNOSIS — H92.02 OTALGIA OF LEFT EAR: ICD-10-CM

## 2022-10-31 PROCEDURE — G8482 FLU IMMUNIZE ORDER/ADMIN: HCPCS | Performed by: NURSE PRACTITIONER

## 2022-10-31 PROCEDURE — 99213 OFFICE O/P EST LOW 20 MIN: CPT | Performed by: NURSE PRACTITIONER

## 2022-10-31 PROCEDURE — G8419 CALC BMI OUT NRM PARAM NOF/U: HCPCS | Performed by: NURSE PRACTITIONER

## 2022-10-31 PROCEDURE — 1036F TOBACCO NON-USER: CPT | Performed by: NURSE PRACTITIONER

## 2022-10-31 PROCEDURE — 3017F COLORECTAL CA SCREEN DOC REV: CPT | Performed by: NURSE PRACTITIONER

## 2022-10-31 PROCEDURE — G8427 DOCREV CUR MEDS BY ELIG CLIN: HCPCS | Performed by: NURSE PRACTITIONER

## 2022-10-31 PROCEDURE — 69209 REMOVE IMPACTED EAR WAX UNI: CPT | Performed by: NURSE PRACTITIONER

## 2022-10-31 RX ORDER — AMOXICILLIN 875 MG/1
875 TABLET, COATED ORAL 2 TIMES DAILY
Qty: 20 TABLET | Refills: 0 | Status: SHIPPED | OUTPATIENT
Start: 2022-10-31 | End: 2022-11-10

## 2022-10-31 ASSESSMENT — ENCOUNTER SYMPTOMS
SINUS PAIN: 0
ABDOMINAL PAIN: 0
DIARRHEA: 0
EYE ITCHING: 0
ABDOMINAL DISTENTION: 0
CONSTIPATION: 0
NAUSEA: 0
APNEA: 0
SHORTNESS OF BREATH: 0
EYE REDNESS: 0
TROUBLE SWALLOWING: 0
SORE THROAT: 0
WHEEZING: 0
COUGH: 0

## 2022-10-31 ASSESSMENT — PATIENT HEALTH QUESTIONNAIRE - PHQ9
SUM OF ALL RESPONSES TO PHQ9 QUESTIONS 1 & 2: 0
1. LITTLE INTEREST OR PLEASURE IN DOING THINGS: 0
SUM OF ALL RESPONSES TO PHQ QUESTIONS 1-9: 0
2. FEELING DOWN, DEPRESSED OR HOPELESS: 0

## 2022-10-31 NOTE — PROGRESS NOTES
Subjective:      Patient ID: Rosy Zapien is a 65 y.o. male who presents today for:  Chief Complaint   Patient presents with    Otalgia     Left ear, ongoing X3 days. States no drainage; can't hear as well out of that ear. Says he used hydrogen peroxide to see if any wax would come out; a little came out but not much. Pt here today with c/o left ear pain that started 3 days ago. Pt reports \"sounds muffled, decreased hearing and he reports ear wax he tried to get out\". Pt declines any distress. Pt aware that I am unable to decipher if he has an ear infection due to ear wax not completely out of ear and he would like an ATB in case his ear does not get better and the pain is still there. Otalgia   There is pain in the left ear. This is a new problem. The current episode started in the past 7 days (x 3 days). The problem has been waxing and waning. There has been no fever. The pain is at a severity of 2/10. The pain is mild. Pertinent negatives include no abdominal pain, coughing, diarrhea, ear discharge, headaches, hearing loss, rash or sore throat. Treatments tried: tried cleaning out, took Motrin. There is no history of a chronic ear infection, hearing loss or a tympanostomy tube. Past Medical History:   Diagnosis Date    DM (diabetes mellitus screen)      Past Surgical History:   Procedure Laterality Date    APPENDECTOMY       Social History     Socioeconomic History    Marital status:      Spouse name: Not on file    Number of children: Not on file    Years of education: Not on file    Highest education level: Not on file   Occupational History    Not on file   Tobacco Use    Smoking status: Never    Smokeless tobacco: Never   Substance and Sexual Activity    Alcohol use:  Yes     Alcohol/week: 6.0 standard drinks     Types: 6 Cans of beer per week    Drug use: No    Sexual activity: Not on file   Other Topics Concern    Not on file   Social History Narrative    Not on file     Social Determinants of Health     Financial Resource Strain: Low Risk     Difficulty of Paying Living Expenses: Not hard at all   Food Insecurity: No Food Insecurity    Worried About Running Out of Food in the Last Year: Never true    Ran Out of Food in the Last Year: Never true   Transportation Needs: Not on file   Physical Activity: Not on file   Stress: Not on file   Social Connections: Not on file   Intimate Partner Violence: Not on file   Housing Stability: Not on file     Family History   Problem Relation Age of Onset    Stroke Father     Arthritis Sister     Diabetes Sister     Diabetes Brother      Allergies   Allergen Reactions    Actos [Pioglitazone]      Dizziness           Review of Systems   Constitutional:  Negative for activity change, appetite change, chills, fatigue and fever. HENT:  Positive for ear pain. Negative for congestion, drooling, ear discharge, hearing loss, postnasal drip, sinus pain, sore throat and trouble swallowing. Eyes:  Negative for redness, itching and visual disturbance. Respiratory:  Negative for apnea, cough, shortness of breath and wheezing. Cardiovascular:  Negative for chest pain and palpitations. Gastrointestinal:  Negative for abdominal distention, abdominal pain, constipation, diarrhea and nausea. Endocrine: Negative for heat intolerance. Genitourinary:  Negative for difficulty urinating, flank pain, genital sores and urgency. Musculoskeletal:  Negative for gait problem, myalgias and neck stiffness. Skin:  Negative for rash. Neurological:  Negative for tremors, seizures, facial asymmetry, weakness and headaches. Hematological:  Negative for adenopathy. Psychiatric/Behavioral:  Negative for behavioral problems, confusion and suicidal ideas. The patient is not hyperactive. All other systems reviewed and are negative.     Objective:   /82 (Site: Right Upper Arm, Position: Sitting, Cuff Size: Large Adult)   Pulse 80   Temp 98.1 °F (36.7 °C) (Oral)   Ht 5' 9\" (1.753 m)   Wt 188 lb (85.3 kg)   SpO2 98%   BMI 27.76 kg/m²     Physical Exam  Vitals and nursing note reviewed. Constitutional:       General: He is awake. He is not in acute distress. Appearance: Normal appearance. He is well-developed, well-groomed and overweight. He is not ill-appearing, toxic-appearing or diaphoretic. HENT:      Head: Normocephalic and atraumatic. Right Ear: No decreased hearing noted. No middle ear effusion. Left Ear: Decreased hearing noted. No middle ear effusion. There is impacted cerumen. Ears:      Comments: On exam pt ear was impacted with black ear wax and irrigation was not completely successful. Pt was advised how to use Debrox and hydrogen peroxide 1:1 at e and if he still is not successful to F/U with ENT. Pt aware. Nose: Nose normal. No rhinorrhea. Mouth/Throat:      Mouth: Mucous membranes are moist.      Pharynx: No oropharyngeal exudate or posterior oropharyngeal erythema. Eyes:      Extraocular Movements: Extraocular movements intact. Conjunctiva/sclera: Conjunctivae normal.      Pupils: Pupils are equal, round, and reactive to light. Cardiovascular:      Rate and Rhythm: Normal rate and regular rhythm. Pulses: Normal pulses. Heart sounds: Normal heart sounds. No murmur heard. Pulmonary:      Effort: Pulmonary effort is normal. No tachypnea, bradypnea, accessory muscle usage or respiratory distress. Breath sounds: Normal breath sounds and air entry. No transmitted upper airway sounds. No decreased breath sounds, wheezing or rhonchi. Abdominal:      General: Abdomen is flat. Bowel sounds are normal.      Palpations: Abdomen is soft. Tenderness: There is no abdominal tenderness. There is no guarding or rebound. Musculoskeletal:         General: No signs of injury. Normal range of motion. Cervical back: Normal range of motion. Lymphadenopathy:      Cervical: No cervical adenopathy. Skin:     General: Skin is warm and dry. Capillary Refill: Capillary refill takes less than 2 seconds. Findings: No bruising or erythema. Neurological:      General: No focal deficit present. Mental Status: He is alert and oriented to person, place, and time. Mental status is at baseline. Motor: No weakness. Psychiatric:         Attention and Perception: Attention and perception normal.         Mood and Affect: Mood and affect normal.         Speech: Speech normal.         Behavior: Behavior normal. Behavior is cooperative. Thought Content: Thought content normal.         Judgment: Judgment normal.       Assessment:       Diagnosis Orders   1. Hearing loss of left ear due to cerumen impaction  HI REMOVAL IMPACTED CERUMEN IRRIGATION/LVG UNILAT    carbamide peroxide (DEBROX) 6.5 % otic solution    Amb External Referral To ENT      2. Otalgia of left ear  amoxicillin (AMOXIL) 875 MG tablet            Plan:      Orders Placed This Encounter   Procedures    Amb External Referral To ENT     Referral Priority:   Routine     Referral Type:   Eval and Treat     Referral Reason:   Specialty Services Required     Referred to Provider:   Hailey Ingram MD     Requested Specialty:   Otolaryngology     Number of Visits Requested:   1    HI REMOVAL IMPACTED CERUMEN IRRIGATION/LVG UNILAT       Orders Placed This Encounter   Medications    carbamide peroxide (DEBROX) 6.5 % otic solution     Sig: Place 5 drops in ear(s) 2 times daily     Dispense:  1 each     Refill:  0    amoxicillin (AMOXIL) 875 MG tablet     Sig: Take 1 tablet by mouth 2 times daily for 10 days     Dispense:  20 tablet     Refill:  0     Pt here today and he agreed to ear irrigation of left ear as pt reports some left ear pain and he tried removing ear wax but only \"got a little out\". Pt declines distress but slight hearing loss noted. Pt tolerated the ear irrigation today in office without any pain or issues noted.  Pt aware we were completely successful with removing all the ear wax and I still was unable to visualize his TM. Pt still has yellow/caramel brown ear wax noted and it is not black noted. Pt reports some hearing back today but requested Amox ATB in case he is not successful and he still has pain or ear is infected. Pt aware this may not work and assist his ear but this was e scripted and pt aware to see ENT for further assistance with ear wax removal if he is unsuccessful at home with the Debrox, hydrogen peroxide combo. Pt verbalized understanding of the [place] today and aware of the red flag s/s. Pt left the [facility] today in stable condition. Discussed signs and symptoms which require immediate follow-up in ED/call to 911. Patient verbalized understanding. Antibiotic Instructions: Complete the full course of antibiotics as ordered. Take each dose with a small snack or meal to lessen potential GI upset. To prevent antibiotic resistance, please take medication as ordered and for the full duration even if you start to feel better. Consider intake of yogurt or probiotic during antibiotic use and for a few days after to help reduce the risk of developing a secondary infection. Separate the yogurt and antibiotic by at least 1 hour. Avoid alcohol while taking antibiotics. No follow-ups on file. Reviewed with the patient: current clinical status, medications, activities and diet. Side effects, adverse effects of the medication prescribed today, as well as treatment plan and result expectations have been discussed with the patient who expresses understanding and desires to proceed. Close follow up to evaluate treatment results and for coordination of care. I have reviewed the patient's medical history in detail and updated the computerized patient record.       Mahi Crystal, APRN - CNP

## 2022-11-08 ENCOUNTER — TELEPHONE (OUTPATIENT)
Dept: GASTROENTEROLOGY | Age: 62
End: 2022-11-08

## 2023-04-19 RX ORDER — INSULIN LISPRO 100 [IU]/ML
INJECTION, SUSPENSION SUBCUTANEOUS
Qty: 10 ADJUSTABLE DOSE PRE-FILLED PEN SYRINGE | Refills: 3 | Status: SHIPPED | OUTPATIENT
Start: 2023-04-19

## 2023-05-08 DIAGNOSIS — E11.69 DM TYPE 2 WITH DIABETIC DYSLIPIDEMIA (HCC): ICD-10-CM

## 2023-05-08 DIAGNOSIS — E78.5 DM TYPE 2 WITH DIABETIC DYSLIPIDEMIA (HCC): ICD-10-CM

## 2023-05-08 RX ORDER — METFORMIN HYDROCHLORIDE 500 MG/1
TABLET, EXTENDED RELEASE ORAL
Qty: 90 TABLET | Refills: 0 | Status: SHIPPED | OUTPATIENT
Start: 2023-05-08 | End: 2023-05-09 | Stop reason: SDUPTHER

## 2023-05-09 ENCOUNTER — TELEPHONE (OUTPATIENT)
Dept: PRIMARY CARE CLINIC | Age: 63
End: 2023-05-09

## 2023-05-09 DIAGNOSIS — E78.5 DM TYPE 2 WITH DIABETIC DYSLIPIDEMIA (HCC): ICD-10-CM

## 2023-05-09 DIAGNOSIS — E11.69 DM TYPE 2 WITH DIABETIC DYSLIPIDEMIA (HCC): ICD-10-CM

## 2023-05-09 RX ORDER — METFORMIN HYDROCHLORIDE 500 MG/1
TABLET, EXTENDED RELEASE ORAL
Qty: 120 TABLET | Refills: 0 | Status: SHIPPED | OUTPATIENT
Start: 2023-05-09 | End: 2023-05-11 | Stop reason: SDUPTHER

## 2023-05-09 NOTE — TELEPHONE ENCOUNTER
Cyndy Carmona from [facility] calling regarding metformin 500. Quantity and directions don't match? Her # is [id number].

## 2023-05-10 ENCOUNTER — TELEPHONE (OUTPATIENT)
Dept: PRIMARY CARE CLINIC | Age: 63
End: 2023-05-10

## 2023-05-10 NOTE — TELEPHONE ENCOUNTER
Metformin sent to wrong pharmacy please resend to [facility] in [place]

## 2023-05-11 DIAGNOSIS — E78.5 DM TYPE 2 WITH DIABETIC DYSLIPIDEMIA (HCC): ICD-10-CM

## 2023-05-11 DIAGNOSIS — E11.69 DM TYPE 2 WITH DIABETIC DYSLIPIDEMIA (HCC): ICD-10-CM

## 2023-05-11 RX ORDER — METFORMIN HYDROCHLORIDE 500 MG/1
TABLET, EXTENDED RELEASE ORAL
Qty: 90 TABLET | Refills: 0 | Status: SHIPPED | OUTPATIENT
Start: 2023-05-11

## 2023-05-11 RX ORDER — METFORMIN HYDROCHLORIDE 500 MG/1
TABLET, EXTENDED RELEASE ORAL
Qty: 120 TABLET | Refills: 0 | Status: SHIPPED | OUTPATIENT
Start: 2023-05-11 | End: 2023-05-11 | Stop reason: SDUPTHER

## 2023-05-24 ENCOUNTER — OFFICE VISIT (OUTPATIENT)
Dept: ENDOCRINOLOGY | Age: 63
End: 2023-05-24
Payer: COMMERCIAL

## 2023-05-24 VITALS
WEIGHT: 183 LBS | HEIGHT: 69 IN | DIASTOLIC BLOOD PRESSURE: 70 MMHG | SYSTOLIC BLOOD PRESSURE: 126 MMHG | HEART RATE: 66 BPM | BODY MASS INDEX: 27.11 KG/M2 | OXYGEN SATURATION: 98 %

## 2023-05-24 DIAGNOSIS — E11.69 DM TYPE 2 WITH DIABETIC DYSLIPIDEMIA (HCC): Primary | ICD-10-CM

## 2023-05-24 DIAGNOSIS — E78.00 PURE HYPERCHOLESTEROLEMIA: ICD-10-CM

## 2023-05-24 DIAGNOSIS — E78.5 DM TYPE 2 WITH DIABETIC DYSLIPIDEMIA (HCC): Primary | ICD-10-CM

## 2023-05-24 LAB
CHP ED QC CHECK: NORMAL
GLUCOSE BLD-MCNC: 113 MG/DL

## 2023-05-24 PROCEDURE — 99213 OFFICE O/P EST LOW 20 MIN: CPT | Performed by: INTERNAL MEDICINE

## 2023-05-24 PROCEDURE — 82962 GLUCOSE BLOOD TEST: CPT | Performed by: INTERNAL MEDICINE

## 2023-05-24 RX ORDER — BLOOD-GLUCOSE SENSOR
EACH MISCELLANEOUS
Qty: 3 EACH | Refills: 4 | Status: SHIPPED | OUTPATIENT
Start: 2023-05-24

## 2023-05-24 RX ORDER — BLOOD-GLUCOSE,RECEIVER,CONT
EACH MISCELLANEOUS
Qty: 1 EACH | Refills: 0 | Status: SHIPPED | OUTPATIENT
Start: 2023-05-24

## 2023-05-24 RX ORDER — INSULIN LISPRO 100 [IU]/ML
INJECTION, SUSPENSION SUBCUTANEOUS
Qty: 30 ADJUSTABLE DOSE PRE-FILLED PEN SYRINGE | Refills: 3 | Status: SHIPPED | OUTPATIENT
Start: 2023-05-24

## 2023-05-24 RX ORDER — METFORMIN HYDROCHLORIDE 500 MG/1
TABLET, EXTENDED RELEASE ORAL
Qty: 360 TABLET | Refills: 3 | Status: SHIPPED | OUTPATIENT
Start: 2023-05-24

## 2023-05-24 NOTE — PROGRESS NOTES
2023    Assessment:       Diagnosis Orders   1. DM type 2 with diabetic dyslipidemia (HCC)  POCT Glucose      2. Pure hypercholesterolemia              PLAN:     Orders Placed This Encounter   Procedures    Hemoglobin A1C     Standing Status:   Future     Standing Expiration Date:       Basic Metabolic Panel     Standing Status:   Future     Standing Expiration Date:   2024    Lipid Panel     Standing Status:   Future     Standing Expiration Date:   2024    POCT Glucose    HM DIABETES FOOT EXAM     Orders Placed This Encounter   Medications    insulin lispro protamine & lispro (HUMALOG MIX 75/25 KWIKPEN) (75-25) 100 UNIT per ML SUPN injection pen     Sig: 10 units am 15 lunch and 25 units dinner E11.65     Dispense:  30 Adjustable Dose Pre-filled Pen Syringe     Refill:  3    metFORMIN (GLUCOPHAGE XR) 500 MG extended release tablet     Si po am and lunch and 2 dinner E11.65     Dispense:  360 tablet     Refill:  3    Continuous Blood Gluc Sensor (DEXCOM G7 SENSOR) MISC     Sig: Every 10 days     Dispense:  3 each     Refill:  4    Continuous Blood Gluc  ([address]) FLAQUITO     Sig: As directed     Dispense:  1 each     Refill:  0     Continue current regimen prescriptions given for Dexcom continuous glucose monitoring    Orders Placed This Encounter   Procedures    POCT Glucose     No orders of the defined types were placed in this encounter. No follow-ups on file.   Subjective:     Chief Complaint   Patient presents with    Diabetes    Hyperlipidemia    Hypertension     Vitals:    23 1138   BP: 126/70   Pulse: 66   SpO2: 98%   Weight: 183 lb (83 kg)   Height: 5' 9\" (1.753 m)     Wt Readings from Last 3 Encounters:   23 183 lb (83 kg)   10/31/22 188 lb (85.3 kg)   22 186 lb (84.4 kg)     BP Readings from Last 3 Encounters:   23 126/70   10/31/22 128/82   22 138/72     Follow-up with type 2  diabetes patient on 75/25 Humalog insulin 3 times a

## 2023-09-07 DIAGNOSIS — E11.69 DM TYPE 2 WITH DIABETIC DYSLIPIDEMIA (HCC): ICD-10-CM

## 2023-09-07 DIAGNOSIS — E78.00 PURE HYPERCHOLESTEROLEMIA: ICD-10-CM

## 2023-09-07 DIAGNOSIS — I10 ESSENTIAL HYPERTENSION: ICD-10-CM

## 2023-09-07 DIAGNOSIS — E78.5 DM TYPE 2 WITH DIABETIC DYSLIPIDEMIA (HCC): ICD-10-CM

## 2023-09-07 RX ORDER — ATORVASTATIN CALCIUM 10 MG/1
TABLET, FILM COATED ORAL
Qty: 90 TABLET | Refills: 0 | Status: SHIPPED | OUTPATIENT
Start: 2023-09-07

## 2023-09-07 RX ORDER — LOSARTAN POTASSIUM 25 MG/1
TABLET ORAL
Qty: 90 TABLET | Refills: 0 | Status: SHIPPED | OUTPATIENT
Start: 2023-09-07

## 2023-11-14 ENCOUNTER — OFFICE VISIT (OUTPATIENT)
Dept: ENDOCRINOLOGY | Age: 63
End: 2023-11-14
Payer: COMMERCIAL

## 2023-11-14 VITALS
WEIGHT: 189 LBS | HEIGHT: 69 IN | DIASTOLIC BLOOD PRESSURE: 77 MMHG | BODY MASS INDEX: 27.99 KG/M2 | OXYGEN SATURATION: 96 % | HEART RATE: 79 BPM | SYSTOLIC BLOOD PRESSURE: 114 MMHG

## 2023-11-14 DIAGNOSIS — E78.00 PURE HYPERCHOLESTEROLEMIA: ICD-10-CM

## 2023-11-14 DIAGNOSIS — I10 ESSENTIAL HYPERTENSION: ICD-10-CM

## 2023-11-14 DIAGNOSIS — E78.5 DM TYPE 2 WITH DIABETIC DYSLIPIDEMIA (HCC): Primary | ICD-10-CM

## 2023-11-14 DIAGNOSIS — E11.69 DM TYPE 2 WITH DIABETIC DYSLIPIDEMIA (HCC): Primary | ICD-10-CM

## 2023-11-14 LAB
CHP ED QC CHECK: NORMAL
GLUCOSE BLD-MCNC: 255 MG/DL
HBA1C MFR BLD: 9.4 %

## 2023-11-14 PROCEDURE — 82962 GLUCOSE BLOOD TEST: CPT | Performed by: INTERNAL MEDICINE

## 2023-11-14 PROCEDURE — 83036 HEMOGLOBIN GLYCOSYLATED A1C: CPT | Performed by: INTERNAL MEDICINE

## 2023-11-14 PROCEDURE — 3078F DIAST BP <80 MM HG: CPT | Performed by: INTERNAL MEDICINE

## 2023-11-14 PROCEDURE — 99213 OFFICE O/P EST LOW 20 MIN: CPT | Performed by: INTERNAL MEDICINE

## 2023-11-14 PROCEDURE — 3074F SYST BP LT 130 MM HG: CPT | Performed by: INTERNAL MEDICINE

## 2023-11-14 PROCEDURE — 3046F HEMOGLOBIN A1C LEVEL >9.0%: CPT | Performed by: INTERNAL MEDICINE

## 2023-11-14 RX ORDER — LOSARTAN POTASSIUM 25 MG/1
TABLET ORAL
Qty: 90 TABLET | Refills: 0 | Status: SHIPPED | OUTPATIENT
Start: 2023-11-14

## 2023-11-14 RX ORDER — METFORMIN HYDROCHLORIDE 500 MG/1
TABLET, EXTENDED RELEASE ORAL
Qty: 360 TABLET | Refills: 3 | Status: SHIPPED | OUTPATIENT
Start: 2023-11-14

## 2023-11-14 RX ORDER — ATORVASTATIN CALCIUM 10 MG/1
10 TABLET, FILM COATED ORAL DAILY
Qty: 90 TABLET | Refills: 0 | Status: SHIPPED | OUTPATIENT
Start: 2023-11-14

## 2023-11-14 RX ORDER — INSULIN LISPRO 100 [IU]/ML
INJECTION, SUSPENSION SUBCUTANEOUS
Qty: 30 ADJUSTABLE DOSE PRE-FILLED PEN SYRINGE | Refills: 3 | Status: SHIPPED | OUTPATIENT
Start: 2023-11-14

## 2023-11-14 ASSESSMENT — ENCOUNTER SYMPTOMS: EYES NEGATIVE: 1

## 2023-11-14 NOTE — PROGRESS NOTES
Types: 6 Cans of beer per week    Drug use: No    Sexual activity: Not on file   Other Topics Concern    Not on file   Social History Narrative    Not on file     Social Determinants of Health     Financial Resource Strain: Low Risk  (12/19/2021)    Overall Financial Resource Strain (CARDIA)     Difficulty of Paying Living Expenses: Not hard at all   Food Insecurity: No Food Insecurity (12/19/2021)    Hunger Vital Sign     Worried About Running Out of Food in the Last Year: Never true     Ran Out of Food in the Last Year: Never true   Transportation Needs: Not on file   Physical Activity: Not on file   Stress: Not on file   Social Connections: Not on file   Intimate Partner Violence: Not on file   Housing Stability: Not on file     Family History   Problem Relation Age of Onset    Stroke Father     Arthritis Sister     Diabetes Sister     Diabetes Brother      Allergies   Allergen Reactions    Actos [Pioglitazone]      Dizziness         Current Outpatient Medications:     atorvastatin (LIPITOR) 10 MG tablet, TAKE 1 TABLET BY MOUTH EVERY DAY, Disp: 90 tablet, Rfl: 0    losartan (COZAAR) 25 MG tablet, TAKE 1 TABLET BY MOUTH ONCE DAILY, Disp: 90 tablet, Rfl: 0    insulin lispro protamine & lispro (HUMALOG MIX 75/25 KWIKPEN) (75-25) 100 UNIT per ML SUPN injection pen, 10 units am 15 lunch and 25 units dinner E11.65, Disp: 30 Adjustable Dose Pre-filled Pen Syringe, Rfl: 3    metFORMIN (GLUCOPHAGE XR) 500 MG extended release tablet, 1 po am and lunch and 2 dinner E11.65, Disp: 360 tablet, Rfl: 3    Continuous Blood Gluc Sensor (DEXCOM G7 SENSOR) MISC, Every 10 days, Disp: 3 each, Rfl: 4    Continuous Blood Gluc  (DEXCOM G7 ) FLAQUITO, As directed, Disp: 1 each, Rfl: 0    Insulin Pen Needle 32G X 4 MM MISC, 1 each by Does not apply route 2 times daily, Disp: 100 each, Rfl: 3    Insulin Pen Needle (NOVOFINE) 32G X 6 MM MISC, Once ad ay, Disp: 100 each, Rfl: 3    vitamin D (CHOLECALCIFEROL) 50 MCG (2000 UT) TABS

## 2024-03-20 DIAGNOSIS — E78.5 DM TYPE 2 WITH DIABETIC DYSLIPIDEMIA (HCC): ICD-10-CM

## 2024-03-20 DIAGNOSIS — E11.69 DM TYPE 2 WITH DIABETIC DYSLIPIDEMIA (HCC): ICD-10-CM

## 2024-03-20 DIAGNOSIS — E78.00 PURE HYPERCHOLESTEROLEMIA: ICD-10-CM

## 2024-03-20 DIAGNOSIS — I10 ESSENTIAL HYPERTENSION: ICD-10-CM

## 2024-03-20 RX ORDER — METFORMIN HYDROCHLORIDE 500 MG/1
TABLET, EXTENDED RELEASE ORAL
Qty: 360 TABLET | Refills: 3 | Status: SHIPPED | OUTPATIENT
Start: 2024-03-20

## 2024-03-20 RX ORDER — INSULIN LISPRO 100 [IU]/ML
INJECTION, SUSPENSION SUBCUTANEOUS
Qty: 30 ADJUSTABLE DOSE PRE-FILLED PEN SYRINGE | Refills: 3 | Status: SHIPPED | OUTPATIENT
Start: 2024-03-20

## 2024-03-20 RX ORDER — LOSARTAN POTASSIUM 25 MG/1
TABLET ORAL
Qty: 90 TABLET | Refills: 3 | Status: SHIPPED | OUTPATIENT
Start: 2024-03-20

## 2024-03-20 RX ORDER — ATORVASTATIN CALCIUM 10 MG/1
10 TABLET, FILM COATED ORAL DAILY
Qty: 90 TABLET | Refills: 3 | Status: SHIPPED | OUTPATIENT
Start: 2024-03-20

## 2024-03-20 NOTE — TELEPHONE ENCOUNTER
Pharmacy requesting medication refill. Please approve or deny this request.    Rx requested:  Requested Prescriptions     Pending Prescriptions Disp Refills    losartan (COZAAR) 25 MG tablet 90 tablet 3     Sig: TAKE 1 TABLET BY MOUTH ONCE DAILY    atorvastatin (LIPITOR) 10 MG tablet 90 tablet 3     Sig: Take 1 tablet by mouth daily    insulin lispro protamine & lispro (HUMALOG MIX 75/25 KWIKPEN) (75-25) 100 UNIT per ML SUPN injection pen 30 Adjustable Dose Pre-filled Pen Syringe 3     Sig: 10 units am 15 lunch and 25 units dinner E11.65    metFORMIN (GLUCOPHAGE XR) 500 MG extended release tablet 360 tablet 3     Si po am and lunch and 2 dinner E11.65         Last Office Visit:   2023      Next Visit Date:  Future Appointments   Date Time Provider Department Center   3/28/2024 10:00 AM Víctor Vitale MD [facility]

## 2024-03-25 DIAGNOSIS — E11.69 DM TYPE 2 WITH DIABETIC DYSLIPIDEMIA (HCC): ICD-10-CM

## 2024-03-25 DIAGNOSIS — E78.5 DM TYPE 2 WITH DIABETIC DYSLIPIDEMIA (HCC): ICD-10-CM

## 2024-03-25 LAB
ANION GAP SERPL CALCULATED.3IONS-SCNC: 12 MEQ/L (ref 9–15)
BUN SERPL-MCNC: 11 MG/DL (ref 8–23)
CALCIUM SERPL-MCNC: 9.7 MG/DL (ref 8.5–9.9)
CHLORIDE SERPL-SCNC: 99 MEQ/L (ref 95–107)
CO2 SERPL-SCNC: 24 MEQ/L (ref 20–31)
CREAT SERPL-MCNC: 0.67 MG/DL (ref 0.7–1.2)
CREAT UR-MCNC: 110.9 MG/DL
GLUCOSE SERPL-MCNC: 197 MG/DL (ref 70–99)
HBA1C MFR BLD: 9 % (ref 4.8–5.9)
MICROALBUMIN UR-MCNC: 2.3 MG/DL
MICROALBUMIN/CREAT UR-RTO: 20.7 MG/G (ref 0–30)
POTASSIUM SERPL-SCNC: 4.2 MEQ/L (ref 3.4–4.9)
SODIUM SERPL-SCNC: 135 MEQ/L (ref 135–144)

## 2024-03-28 ENCOUNTER — OFFICE VISIT (OUTPATIENT)
Dept: ENDOCRINOLOGY | Age: 64
End: 2024-03-28
Payer: COMMERCIAL

## 2024-03-28 VITALS
WEIGHT: 189 LBS | HEIGHT: 69 IN | SYSTOLIC BLOOD PRESSURE: 133 MMHG | OXYGEN SATURATION: 95 % | BODY MASS INDEX: 27.99 KG/M2 | DIASTOLIC BLOOD PRESSURE: 84 MMHG | HEART RATE: 82 BPM

## 2024-03-28 DIAGNOSIS — E78.5 DM TYPE 2 WITH DIABETIC DYSLIPIDEMIA (HCC): Primary | ICD-10-CM

## 2024-03-28 DIAGNOSIS — Z46.81 COUNSELING FOR INSULIN PUMP: ICD-10-CM

## 2024-03-28 DIAGNOSIS — E11.69 DM TYPE 2 WITH DIABETIC DYSLIPIDEMIA (HCC): Primary | ICD-10-CM

## 2024-03-28 LAB
CHP ED QC CHECK: NORMAL
GLUCOSE BLD-MCNC: 251 MG/DL

## 2024-03-28 PROCEDURE — 82962 GLUCOSE BLOOD TEST: CPT | Performed by: INTERNAL MEDICINE

## 2024-03-28 PROCEDURE — 99213 OFFICE O/P EST LOW 20 MIN: CPT | Performed by: INTERNAL MEDICINE

## 2024-03-28 PROCEDURE — 3052F HG A1C>EQUAL 8.0%<EQUAL 9.0%: CPT | Performed by: INTERNAL MEDICINE

## 2024-03-28 ASSESSMENT — ENCOUNTER SYMPTOMS
VISUAL CHANGE: 0
EYES NEGATIVE: 1

## 2024-03-28 NOTE — PROGRESS NOTES
3/28/2024    Assessment:       Diagnosis Orders   1. DM type 2 with diabetic dyslipidemia (HCC)  POCT Glucose    Hemoglobin A1C    Basic Metabolic Panel    Hepatic Function Panel    PSA, Diagnostic      2. Counseling for insulin pump              PLAN:     Orders Placed This Encounter   Procedures    Hemoglobin A1C     Standing Status:   Future     Standing Expiration Date:   3/28/2025    Basic Metabolic Panel     Standing Status:   Future     Standing Expiration Date:   3/28/2025    POCT Glucose   Continue current regimen patient to talk to [employer] representative regarding going on insulin pump therapy  Diabetes education provided today:    Insulin pumps, how they work and how they affect blood sugar levels.  Continuous Glucose monitor. How it works and checks blood sugars every 5 min. for 4 days during our tests.  Managing high and low sugar readings.      Orders Placed This Encounter   Procedures    POCT Glucose     No orders of the defined types were placed in this encounter.    No follow-ups on file.  Subjective:     Chief Complaint   Patient presents with    Diabetes    Hyperlipidemia     Vitals:    03/28/24 1035   BP: 133/84   Pulse: 82   SpO2: 95%   Weight: 85.7 kg (189 lb)   Height: 1.753 m (5' 9\")     Wt Readings from Last 3 Encounters:   03/28/24 85.7 kg (189 lb)   12/21/23 83.9 kg (185 lb)   11/14/23 85.7 kg (189 lb)     BP Readings from Last 3 Encounters:   03/28/24 133/84   12/21/23 122/80   11/14/23 114/77     Follow-up on type 2 diabetes on 75/25 Humalog 3 times daily 10 units in the morning 15 at lunch 25 at dinner plus metformin hemoglobin A1c still more than 9 wants to have Dexcom for him today  Hemoglobin A1C       Date                     Value               Ref Range           Status                03/25/2024               9.0 (H)             4.8 - 5.9 %         Final            ----------      Diabetes  He presents for his follow-up diabetic visit. He has type 2 diabetes mellitus. His

## 2024-04-19 NOTE — TELEPHONE ENCOUNTER
Patient requesting medication refill. Please approve or deny this request.    Rx requested:  Requested Prescriptions     Pending Prescriptions Disp Refills    Insulin Pen Needle 32G X 6 MM MISC 100 each 3     Sig: Use once daily E11.65         Last Office Visit:   3/28/2024      Next Visit Date:  Future Appointments   Date Time Provider Department Center   8/1/2024 10:00 AM Víctor Vitale MD [facility]

## 2024-11-14 DIAGNOSIS — E11.69 DM TYPE 2 WITH DIABETIC DYSLIPIDEMIA (HCC): ICD-10-CM

## 2024-11-14 DIAGNOSIS — E78.5 DM TYPE 2 WITH DIABETIC DYSLIPIDEMIA (HCC): ICD-10-CM

## 2024-11-14 RX ORDER — INSULIN LISPRO 100 [IU]/ML
INJECTION, SUSPENSION SUBCUTANEOUS
Qty: 30 ADJUSTABLE DOSE PRE-FILLED PEN SYRINGE | Refills: 1 | Status: SHIPPED | OUTPATIENT
Start: 2024-11-14

## 2024-12-23 ENCOUNTER — NURSE ONLY (OUTPATIENT)
Dept: PRIMARY CARE CLINIC | Age: 64
End: 2024-12-23
Payer: COMMERCIAL

## 2024-12-23 DIAGNOSIS — E78.5 DM TYPE 2 WITH DIABETIC DYSLIPIDEMIA (HCC): ICD-10-CM

## 2024-12-23 DIAGNOSIS — Z23 NEED FOR INFLUENZA VACCINATION: Primary | ICD-10-CM

## 2024-12-23 DIAGNOSIS — E11.69 DM TYPE 2 WITH DIABETIC DYSLIPIDEMIA (HCC): ICD-10-CM

## 2024-12-23 LAB
ALBUMIN SERPL-MCNC: 4.5 G/DL (ref 3.5–4.6)
ALP SERPL-CCNC: 95 U/L (ref 35–104)
ALT SERPL-CCNC: 22 U/L (ref 0–41)
ANION GAP SERPL CALCULATED.3IONS-SCNC: 13 MEQ/L (ref 9–15)
AST SERPL-CCNC: 15 U/L (ref 0–40)
BILIRUB DIRECT SERPL-MCNC: <0.2 MG/DL (ref 0–0.4)
BILIRUB INDIRECT SERPL-MCNC: ABNORMAL MG/DL (ref 0–0.6)
BILIRUB SERPL-MCNC: 1 MG/DL (ref 0.2–0.7)
BUN SERPL-MCNC: 11 MG/DL (ref 8–23)
CALCIUM SERPL-MCNC: 9.5 MG/DL (ref 8.5–9.9)
CHLORIDE SERPL-SCNC: 100 MEQ/L (ref 95–107)
CO2 SERPL-SCNC: 23 MEQ/L (ref 20–31)
CREAT SERPL-MCNC: 0.6 MG/DL (ref 0.7–1.2)
GLUCOSE SERPL-MCNC: 222 MG/DL (ref 70–99)
POTASSIUM SERPL-SCNC: 4.3 MEQ/L (ref 3.4–4.9)
PROT SERPL-MCNC: 7.2 G/DL (ref 6.3–8)
SODIUM SERPL-SCNC: 136 MEQ/L (ref 135–144)

## 2024-12-23 PROCEDURE — 90471 IMMUNIZATION ADMIN: CPT | Performed by: INTERNAL MEDICINE

## 2024-12-23 PROCEDURE — 90661 CCIIV3 VAC ABX FR 0.5 ML IM: CPT | Performed by: INTERNAL MEDICINE

## 2024-12-23 PROCEDURE — 36415 COLL VENOUS BLD VENIPUNCTURE: CPT | Performed by: INTERNAL MEDICINE

## 2024-12-24 LAB
ESTIMATED AVERAGE GLUCOSE: 214 MG/DL
HBA1C MFR BLD: 9.1 % (ref 4–6)

## 2024-12-31 ENCOUNTER — OFFICE VISIT (OUTPATIENT)
Dept: ENDOCRINOLOGY | Age: 64
End: 2024-12-31
Payer: COMMERCIAL

## 2024-12-31 VITALS
BODY MASS INDEX: 28.44 KG/M2 | WEIGHT: 192 LBS | DIASTOLIC BLOOD PRESSURE: 89 MMHG | SYSTOLIC BLOOD PRESSURE: 148 MMHG | OXYGEN SATURATION: 96 % | HEIGHT: 69 IN

## 2024-12-31 DIAGNOSIS — E78.00 PURE HYPERCHOLESTEROLEMIA: ICD-10-CM

## 2024-12-31 DIAGNOSIS — E11.69 DM TYPE 2 WITH DIABETIC DYSLIPIDEMIA (HCC): Primary | ICD-10-CM

## 2024-12-31 DIAGNOSIS — I10 ESSENTIAL HYPERTENSION: ICD-10-CM

## 2024-12-31 DIAGNOSIS — E78.5 DM TYPE 2 WITH DIABETIC DYSLIPIDEMIA (HCC): Primary | ICD-10-CM

## 2024-12-31 LAB
CHP ED QC CHECK: NORMAL
GLUCOSE BLD-MCNC: 229 MG/DL

## 2024-12-31 PROCEDURE — 99214 OFFICE O/P EST MOD 30 MIN: CPT | Performed by: INTERNAL MEDICINE

## 2024-12-31 PROCEDURE — 3077F SYST BP >= 140 MM HG: CPT | Performed by: INTERNAL MEDICINE

## 2024-12-31 PROCEDURE — 82962 GLUCOSE BLOOD TEST: CPT | Performed by: INTERNAL MEDICINE

## 2024-12-31 PROCEDURE — 3046F HEMOGLOBIN A1C LEVEL >9.0%: CPT | Performed by: INTERNAL MEDICINE

## 2024-12-31 PROCEDURE — 3079F DIAST BP 80-89 MM HG: CPT | Performed by: INTERNAL MEDICINE

## 2024-12-31 RX ORDER — METFORMIN HYDROCHLORIDE 500 MG/1
TABLET, EXTENDED RELEASE ORAL
Qty: 360 TABLET | Refills: 3 | Status: SHIPPED | OUTPATIENT
Start: 2024-12-31

## 2024-12-31 RX ORDER — ACYCLOVIR 400 MG/1
TABLET ORAL
Qty: 3 EACH | Refills: 5 | Status: SHIPPED | OUTPATIENT
Start: 2024-12-31 | End: 2024-12-31 | Stop reason: SDUPTHER

## 2024-12-31 RX ORDER — INSULIN LISPRO 100 [IU]/ML
INJECTION, SUSPENSION SUBCUTANEOUS
Qty: 30 ADJUSTABLE DOSE PRE-FILLED PEN SYRINGE | Refills: 5 | Status: SHIPPED | OUTPATIENT
Start: 2024-12-31

## 2024-12-31 RX ORDER — LOSARTAN POTASSIUM 25 MG/1
TABLET ORAL
Qty: 90 TABLET | Refills: 3 | Status: SHIPPED | OUTPATIENT
Start: 2024-12-31

## 2024-12-31 RX ORDER — ATORVASTATIN CALCIUM 10 MG/1
10 TABLET, FILM COATED ORAL DAILY
Qty: 90 TABLET | Refills: 3 | Status: SHIPPED | OUTPATIENT
Start: 2024-12-31

## 2024-12-31 RX ORDER — ACYCLOVIR 400 MG/1
TABLET ORAL
Qty: 3 EACH | Refills: 5 | Status: SHIPPED | OUTPATIENT
Start: 2024-12-31

## 2024-12-31 ASSESSMENT — ENCOUNTER SYMPTOMS
VISUAL CHANGE: 0
EYES NEGATIVE: 1

## 2024-12-31 NOTE — PROGRESS NOTES
\"T4FREE\"  No results found for: \"TPOABS\"    Review of Systems   Constitutional:  Negative for weight loss.   Eyes: Negative.    Cardiovascular: Negative.    Endocrine: Negative for polyuria.   All other systems reviewed and are negative.      Objective:   Physical Exam  Vitals reviewed.   Constitutional:       General: He is not in acute distress.     Appearance: Normal appearance. He is normal weight.   HENT:      Head: Normocephalic and atraumatic.      Right Ear: External ear normal.      Left Ear: External ear normal.      Nose: Nose normal.   Eyes:      General: No scleral icterus.        Right eye: No discharge.         Left eye: No discharge.      Extraocular Movements: Extraocular movements intact.      Conjunctiva/sclera: Conjunctivae normal.   Cardiovascular:      Rate and Rhythm: Normal rate.   Pulmonary:      Effort: Pulmonary effort is normal.   Abdominal:      Palpations: Abdomen is soft.   Musculoskeletal:         General: Normal range of motion.      Cervical back: Normal range of motion and neck supple.   Skin:     Findings: No lesion or rash.   Neurological:      General: No focal deficit present.      Mental Status: He is alert and oriented to person, place, and time.   Psychiatric:         Mood and Affect: Mood normal.         Behavior: Behavior normal.

## 2025-03-24 DIAGNOSIS — E11.69 DM TYPE 2 WITH DIABETIC DYSLIPIDEMIA (HCC): ICD-10-CM

## 2025-03-24 DIAGNOSIS — E78.5 DM TYPE 2 WITH DIABETIC DYSLIPIDEMIA (HCC): ICD-10-CM

## 2025-03-24 RX ORDER — METFORMIN HYDROCHLORIDE 500 MG/1
TABLET, EXTENDED RELEASE ORAL
Qty: 360 TABLET | Refills: 3 | Status: SHIPPED | OUTPATIENT
Start: 2025-03-24

## 2025-04-15 DIAGNOSIS — E11.69 DM TYPE 2 WITH DIABETIC DYSLIPIDEMIA (HCC): ICD-10-CM

## 2025-04-15 DIAGNOSIS — I10 ESSENTIAL HYPERTENSION: ICD-10-CM

## 2025-04-15 DIAGNOSIS — E78.5 DM TYPE 2 WITH DIABETIC DYSLIPIDEMIA (HCC): ICD-10-CM

## 2025-04-15 DIAGNOSIS — E78.00 PURE HYPERCHOLESTEROLEMIA: ICD-10-CM

## 2025-04-15 RX ORDER — ATORVASTATIN CALCIUM 10 MG/1
10 TABLET, FILM COATED ORAL DAILY
Qty: 90 TABLET | Refills: 3 | Status: SHIPPED | OUTPATIENT
Start: 2025-04-15

## 2025-04-15 RX ORDER — LOSARTAN POTASSIUM 25 MG/1
TABLET ORAL
Qty: 90 TABLET | Refills: 3 | Status: SHIPPED | OUTPATIENT
Start: 2025-04-15

## 2025-04-15 RX ORDER — METFORMIN HYDROCHLORIDE 500 MG/1
TABLET, EXTENDED RELEASE ORAL
Qty: 360 TABLET | Refills: 3 | Status: SHIPPED | OUTPATIENT
Start: 2025-04-15

## 2025-07-28 DIAGNOSIS — E11.69 DM TYPE 2 WITH DIABETIC DYSLIPIDEMIA (HCC): ICD-10-CM

## 2025-07-28 DIAGNOSIS — E78.5 DM TYPE 2 WITH DIABETIC DYSLIPIDEMIA (HCC): ICD-10-CM

## 2025-07-28 RX ORDER — INSULIN LISPRO 100 [IU]/ML
INJECTION, SUSPENSION SUBCUTANEOUS
Qty: 30 ADJUSTABLE DOSE PRE-FILLED PEN SYRINGE | Refills: 0 | Status: SHIPPED | OUTPATIENT
Start: 2025-07-28

## 2025-09-06 ENCOUNTER — HOSPITAL ENCOUNTER (OUTPATIENT)
Dept: RADIOLOGY | Facility: CLINIC | Age: 65
Discharge: HOME | End: 2025-09-06
Payer: COMMERCIAL

## 2025-09-06 DIAGNOSIS — I10 ESSENTIAL (PRIMARY) HYPERTENSION: ICD-10-CM

## 2025-09-06 DIAGNOSIS — E11.69 TYPE 2 DIABETES MELLITUS WITH OTHER SPECIFIED COMPLICATION: ICD-10-CM

## 2025-09-06 DIAGNOSIS — E78.5 HYPERLIPIDEMIA, UNSPECIFIED: ICD-10-CM

## 2025-09-06 PROCEDURE — 75571 CT HRT W/O DYE W/CA TEST: CPT
